# Patient Record
Sex: FEMALE | Race: WHITE | NOT HISPANIC OR LATINO | Employment: OTHER | URBAN - NONMETROPOLITAN AREA
[De-identification: names, ages, dates, MRNs, and addresses within clinical notes are randomized per-mention and may not be internally consistent; named-entity substitution may affect disease eponyms.]

---

## 2021-01-22 ENCOUNTER — HOSPITAL ENCOUNTER (EMERGENCY)
Facility: HOSPITAL | Age: 46
Discharge: HOME | End: 2021-01-22
Attending: EMERGENCY MEDICINE
Payer: MEDICARE

## 2021-01-22 VITALS
RESPIRATION RATE: 18 BRPM | TEMPERATURE: 98.1 F | OXYGEN SATURATION: 98 % | HEART RATE: 88 BPM | DIASTOLIC BLOOD PRESSURE: 64 MMHG | SYSTOLIC BLOOD PRESSURE: 116 MMHG

## 2021-01-22 DIAGNOSIS — R19.7 DIARRHEA: Primary | ICD-10-CM

## 2021-01-22 LAB
ALBUMIN SERPL BCP-MCNC: 3.1 G/DL (ref 3.5–5)
ALP SERPL-CCNC: 70 U/L (ref 46–116)
ALT SERPL W P-5'-P-CCNC: 24 U/L (ref 12–78)
ANION GAP SERPL CALCULATED.3IONS-SCNC: 7 MMOL/L (ref 4–13)
AST SERPL W P-5'-P-CCNC: 12 U/L (ref 5–45)
BASOPHILS # BLD AUTO: 0.04 THOUSANDS/ΜL (ref 0–0.1)
BASOPHILS NFR BLD AUTO: 1 % (ref 0–1)
BILIRUB SERPL-MCNC: 0.37 MG/DL (ref 0.2–1)
BUN SERPL-MCNC: 11 MG/DL (ref 5–25)
CALCIUM ALBUM COR SERPL-MCNC: 9.3 MG/DL (ref 8.3–10.1)
CALCIUM SERPL-MCNC: 8.6 MG/DL (ref 8.3–10.1)
CHLORIDE SERPL-SCNC: 104 MMOL/L (ref 100–108)
CO2 SERPL-SCNC: 28 MMOL/L (ref 21–32)
CREAT SERPL-MCNC: 0.87 MG/DL (ref 0.6–1.3)
EOSINOPHIL # BLD AUTO: 0.27 THOUSAND/ΜL (ref 0–0.61)
EOSINOPHIL NFR BLD AUTO: 3 % (ref 0–6)
ERYTHROCYTE [DISTWIDTH] IN BLOOD BY AUTOMATED COUNT: 12.4 % (ref 11.6–15.1)
GFR SERPL CREATININE-BSD FRML MDRD: 82 ML/MIN/1.73SQ M
GLUCOSE SERPL-MCNC: 89 MG/DL (ref 65–140)
HCT VFR BLD AUTO: 37.5 % (ref 34.8–46.1)
HGB BLD-MCNC: 12.1 G/DL (ref 11.5–15.4)
IMM GRANULOCYTES # BLD AUTO: 0.02 THOUSAND/UL (ref 0–0.2)
IMM GRANULOCYTES NFR BLD AUTO: 0 % (ref 0–2)
LACTATE SERPL-SCNC: 0.9 MMOL/L (ref 0.5–2)
LYMPHOCYTES # BLD AUTO: 1.82 THOUSANDS/ΜL (ref 0.6–4.47)
LYMPHOCYTES NFR BLD AUTO: 23 % (ref 14–44)
MCH RBC QN AUTO: 29.1 PG (ref 26.8–34.3)
MCHC RBC AUTO-ENTMCNC: 32.3 G/DL (ref 31.4–37.4)
MCV RBC AUTO: 90 FL (ref 82–98)
MONOCYTES # BLD AUTO: 0.87 THOUSAND/ΜL (ref 0.17–1.22)
MONOCYTES NFR BLD AUTO: 11 % (ref 4–12)
NEUTROPHILS # BLD AUTO: 4.83 THOUSANDS/ΜL (ref 1.85–7.62)
NEUTS SEG NFR BLD AUTO: 62 % (ref 43–75)
NRBC BLD AUTO-RTO: 0 /100 WBCS
PLATELET # BLD AUTO: 203 THOUSANDS/UL (ref 149–390)
PMV BLD AUTO: 10.5 FL (ref 8.9–12.7)
POTASSIUM SERPL-SCNC: 3.7 MMOL/L (ref 3.5–5.3)
PROT SERPL-MCNC: 6.8 G/DL (ref 6.4–8.2)
RBC # BLD AUTO: 4.16 MILLION/UL (ref 3.81–5.12)
SODIUM SERPL-SCNC: 139 MMOL/L (ref 136–145)
WBC # BLD AUTO: 7.85 THOUSAND/UL (ref 4.31–10.16)

## 2021-01-22 PROCEDURE — 83605 ASSAY OF LACTIC ACID: CPT | Performed by: PHYSICIAN ASSISTANT

## 2021-01-22 PROCEDURE — 96361 HYDRATE IV INFUSION ADD-ON: CPT

## 2021-01-22 PROCEDURE — 99282 EMERGENCY DEPT VISIT SF MDM: CPT | Performed by: EMERGENCY MEDICINE

## 2021-01-22 PROCEDURE — 85025 COMPLETE CBC W/AUTO DIFF WBC: CPT | Performed by: PHYSICIAN ASSISTANT

## 2021-01-22 PROCEDURE — 96360 HYDRATION IV INFUSION INIT: CPT

## 2021-01-22 PROCEDURE — 80053 COMPREHEN METABOLIC PANEL: CPT | Performed by: PHYSICIAN ASSISTANT

## 2021-01-22 PROCEDURE — 99284 EMERGENCY DEPT VISIT MOD MDM: CPT

## 2021-01-22 PROCEDURE — 36415 COLL VENOUS BLD VENIPUNCTURE: CPT | Performed by: PHYSICIAN ASSISTANT

## 2021-01-22 RX ORDER — ARIPIPRAZOLE 20 MG/1
20 TABLET ORAL DAILY
COMMUNITY

## 2021-01-22 RX ORDER — LAMOTRIGINE 150 MG/1
TABLET ORAL
COMMUNITY

## 2021-01-22 RX ORDER — SERTRALINE HYDROCHLORIDE 100 MG/1
100 TABLET, FILM COATED ORAL DAILY
COMMUNITY

## 2021-01-22 RX ADMIN — SODIUM CHLORIDE 1000 ML: 0.9 INJECTION, SOLUTION INTRAVENOUS at 16:42

## 2021-01-22 NOTE — ED PROVIDER NOTES
History  Chief Complaint   Patient presents with    Diarrhea     Patient states diarrhea for approximately 11 days  C/O chills and abdominal cramping  Took immodium last pm with minimal relief  37-year-old female presents emergency department for evaluation of diarrhea  Patient with recent hospital admission to [facility] for acute respiratory failure, NSTEMI was admitted from 12/31/20 to 1/9/21  Patient states she was on antibiotics at this time however is unsure of what this antibiotic was  Patient states she does not remember much from her visit  States she did call her cardiologist and ask if any new medications that she has been prescribed could be causing this symptoms and they had said no but it could be C diff due to antibiotics  Patient states daughter had C diff several years ago and her stool has similar odor  She reports she is going approximately 18 times a day  Denies any black tarry stools or bright red blood in her stool  Patient reports some mild abdominal cramping prior to having BM today  Denies any current abdominal pain  She denies fevers  Reports she has had intermittent chills  She denies nausea or vomiting  Denies decreased urination  Denies urinary frequency, hematuria, dysuria  Patient took 3 Imodium prior to arrival has not had bowel movement since  Patient states she has been trying to get a family doctor but due to [place] nobody is accepting new patients  History provided by:  Patient  Diarrhea  Quality:  Watery  Severity:  Moderate  Onset quality:  Gradual  Number of episodes:  18/day  Progression:  Unchanged  Relieved by: Imodium  Associated symptoms: chills    Associated symptoms: no abdominal pain, no arthralgias, no recent cough, no diaphoresis, no fever, no headaches, no myalgias, no URI and no vomiting        Prior to Admission Medications   Prescriptions Last Dose Informant Patient Reported? Taking?    ARIPiprazole (ABILIFY) 20 MG tablet Yes No   Sig: Take 20 mg by mouth daily   lamoTRIgine (LaMICtal) 150 MG tablet   Yes No   Sig: lamotrigine 150 mg tablet   sertraline (ZOLOFT) 100 mg tablet   Yes No   Sig: Take 100 mg by mouth daily      Facility-Administered Medications: None       Past Medical History:   Diagnosis Date    Depression     Hyperlipemia        History reviewed  No pertinent surgical history  History reviewed  No pertinent family history  I have reviewed and agree with the history as documented  E-Cigarette/Vaping    E-Cigarette Use Never User      E-Cigarette/Vaping Substances     Social History     Tobacco Use    Smoking status: Never Smoker    Smokeless tobacco: Never Used   Substance Use Topics    Alcohol use: Not Currently    Drug use: Never       Review of Systems   Constitutional: Positive for chills  Negative for appetite change, diaphoresis, fatigue and fever  HENT: Negative  Eyes: Negative for visual disturbance  Respiratory: Negative for cough, choking, chest tightness, shortness of breath, wheezing and stridor  Cardiovascular: Negative  Negative for chest pain, palpitations and leg swelling  Gastrointestinal: Positive for diarrhea  Negative for abdominal distention, abdominal pain, anal bleeding, blood in stool, constipation, nausea, rectal pain and vomiting  Genitourinary: Negative  Musculoskeletal: Negative  Negative for arthralgias and myalgias  Skin: Negative  Neurological: Negative  Negative for headaches  All other systems reviewed and are negative  Physical Exam  Physical Exam  Vitals signs and nursing note reviewed  Constitutional:       General: She is not in acute distress  Appearance: Normal appearance  She is normal weight  She is not ill-appearing, toxic-appearing or diaphoretic  HENT:      Head: Normocephalic and atraumatic  Nose: Nose normal  No congestion or rhinorrhea        Mouth/Throat:      Mouth: Mucous membranes are moist       Pharynx: Oropharynx is clear  No oropharyngeal exudate or posterior oropharyngeal erythema  Eyes:      Extraocular Movements: Extraocular movements intact  Conjunctiva/sclera: Conjunctivae normal       Pupils: Pupils are equal, round, and reactive to light  Neck:      Musculoskeletal: Normal range of motion and neck supple  No muscular tenderness  Cardiovascular:      Rate and Rhythm: Normal rate and regular rhythm  Pulmonary:      Effort: Pulmonary effort is normal  No respiratory distress  Breath sounds: Normal breath sounds  No stridor  No wheezing, rhonchi or rales  Chest:      Chest wall: No tenderness  Abdominal:      General: Abdomen is flat  Bowel sounds are normal  There is no distension  Palpations: Abdomen is soft  Tenderness: There is no abdominal tenderness  There is no left CVA tenderness or guarding  Musculoskeletal: Normal range of motion  General: No swelling, tenderness or deformity  Right lower leg: No edema  Left lower leg: No edema  Skin:     General: Skin is warm and dry  Capillary Refill: Capillary refill takes less than 2 seconds  Coloration: Skin is not pale  Findings: No bruising, erythema, lesion or rash  Neurological:      General: No focal deficit present  Mental Status: She is alert and oriented to person, place, and time  Motor: No weakness  Gait: Gait normal    Psychiatric:         Mood and Affect: Mood normal          Behavior: Behavior normal          Thought Content:  Thought content normal          Judgment: Judgment normal          Vital Signs  ED Triage Vitals [01/22/21 1557]   Temperature Pulse Respirations Blood Pressure SpO2   98 1 °F (36 7 °C) 85 16 127/70 98 %      Temp Source Heart Rate Source Patient Position - Orthostatic VS BP Location FiO2 (%)   Temporal Monitor Lying Left arm --      Pain Score       5           Vitals:    01/22/21 1557 01/22/21 1600 01/22/21 1815   BP: 127/70 118/66 116/64 Pulse: 85 91 88   Patient Position - Orthostatic VS: Lying           Visual Acuity      ED Medications  Medications   sodium chloride 0 9 % bolus 1,000 mL (0 mL Intravenous Stopped 1/22/21 1813)       Diagnostic Studies  Results Reviewed     Procedure Component Value Units Date/Time    Lactic acid [[id number]]  (Normal) Collected: 01/22/21 1642    Lab Status: Final result Specimen: Blood from Arm, Left Updated: 01/22/21 1717     LACTIC ACID 0 9 mmol/L     Narrative:      Result may be elevated if tourniquet was used during collection      Comprehensive metabolic panel [[id number]]  (Abnormal) Collected: 01/22/21 1642    Lab Status: Final result Specimen: Blood from Arm, Left Updated: 01/22/21 1712     Sodium 139 mmol/L      Potassium 3 7 mmol/L      Chloride 104 mmol/L      CO2 28 mmol/L      ANION GAP 7 mmol/L      BUN 11 mg/dL      Creatinine 0 87 mg/dL      Glucose 89 mg/dL      Calcium 8 6 mg/dL      Corrected Calcium 9 3 mg/dL      AST 12 U/L      ALT 24 U/L      Alkaline Phosphatase 70 U/L      Total Protein 6 8 g/dL      Albumin 3 1 g/dL      Total Bilirubin 0 37 mg/dL      eGFR 82 ml/min/1 73sq m     Narrative:      [place] guidelines for Chronic Kidney Disease (CKD):     Stage 1 with normal or high GFR (GFR > 90 mL/min/1 73 square meters)    Stage 2 Mild CKD (GFR = 60-89 mL/min/1 73 square meters)    Stage 3A Moderate CKD (GFR = 45-59 mL/min/1 73 square meters)    Stage 3B Moderate CKD (GFR = 30-44 mL/min/1 73 square meters)    Stage 4 Severe CKD (GFR = 15-29 mL/min/1 73 square meters)    Stage 5 End Stage CKD (GFR <15 mL/min/1 73 square meters)  Note: GFR calculation is accurate only with a steady state creatinine    CBC and differential [[id number]] Collected: 01/22/21 1642    Lab Status: Final result Specimen: Blood from Arm, Left Updated: 01/22/21 1655     WBC 7 85 Thousand/uL      RBC 4 16 Million/uL      Hemoglobin 12 1 g/dL      Hematocrit 37 5 %      MCV 90 fL      MCH 29 1 pg      MCHC 32 3 g/dL      RDW 12 4 %      MPV 10 5 fL      Platelets 596 Thousands/uL      nRBC 0 /100 WBCs      Neutrophils Relative 62 %      Immat GRANS % 0 %      Lymphocytes Relative 23 %      Monocytes Relative 11 %      Eosinophils Relative 3 %      Basophils Relative 1 %      Neutrophils Absolute 4 83 Thousands/µL      Immature Grans Absolute 0 02 Thousand/uL      Lymphocytes Absolute 1 82 Thousands/µL      Monocytes Absolute 0 87 Thousand/µL      Eosinophils Absolute 0 27 Thousand/µL      Basophils Absolute 0 04 Thousands/µL     Clostridium difficile toxin by PCR with EIA [[id number]]     Lab Status: No result Specimen: Stool from Per Rectum                  No orders to display              Procedures  Procedures         ED Course  ED Course as of Jan 22 1822 Fri Jan 22, 2021   1702 CBC unremarkable      1742 CMP unremarkable  Lactate normal      1756 I discussed results and findings with patient  She is resting comfortably  Was unable to provide stool sample in the emergency department  We discussed symptomatic treatment and symptoms that require prompt return to the ED for further evaluation patient verbalized understanding  Patient will go home with outpatient order for C diff and will provide sample when she is able  Family doctor referral was placed this patient does not currently PCP  Patient agreed with this treatment plan she was educated on a bland diet  He remained well emergency department was discharged                  MDM  Number of Diagnoses or Management Options  Diarrhea: new and requires workup  Diagnosis management comments: 82-year-old female presents emergency department for evaluation of diarrhea times several days  Vitals in medical record reviewed  Patient concern for C diff  On exam abdomen soft, nontender, nondistended  Moist mucous membranes  Patient received fluids in the emergency department  Laboratory findings relatively unremarkable  No leukocytosis  Vitals stable  Patient unable to provide stool sample  We discussed results and findings, patient sent home with outpatient stool sample study order, PCP referral was placed  Patient was educated on symptomatic treatment and symptoms that require prompt return to the ED for further evaluation and she verbalized understanding  She agreed to this treatment plan and was discharged       Amount and/or Complexity of Data Reviewed  Clinical lab tests: ordered and reviewed  Review and summarize past medical records: yes        Disposition  Final diagnoses:   Diarrhea     Time reflects when diagnosis was documented in both MDM as applicable and the Disposition within this note     Time User Action Codes Description Comment    1/22/2021  5:47 PM Danial Proctor [R19 7] Diarrhea       ED Disposition     ED Disposition Condition Date/Time Comment    Discharge Stable Fri Jan 22, 2021  5:47 PM Tiffanie Stairs discharge to home/self care  Follow-up Information     Follow up With Specialties Details Why Emily U  94  In 1 week As needed, If symptoms worsen [address] [address]  [phone]            Discharge Medication List as of 1/22/2021  6:01 PM      CONTINUE these medications which have NOT CHANGED    Details   ARIPiprazole (ABILIFY) 20 MG tablet Take 20 mg by mouth daily, Historical Med      lamoTRIgine (LaMICtal) 150 MG tablet lamotrigine 150 mg tablet, Historical Med      sertraline (ZOLOFT) 100 mg tablet Take 100 mg by mouth daily, Historical Med           Outpatient Discharge Orders   Clostridium difficile toxin by PCR with EIA   Standing Status: Future Standing Exp   Date: 01/22/22       PDMP Review     None          ED Provider  Electronically Signed by           Vipul Cardenas PA-C  01/22/21 4740

## 2021-01-22 NOTE — DISCHARGE INSTRUCTIONS
If you have any new or worsening symptoms please return to the emergency department  Please return with stool sample when you are able  Please follow up with a family doctor, a referral has been placed for you and the number for [facility] is provided on these discharge instructions

## 2021-01-23 ENCOUNTER — LAB (OUTPATIENT)
Dept: LAB | Facility: HOSPITAL | Age: 46
End: 2021-01-23
Attending: PHYSICIAN ASSISTANT
Payer: MEDICARE

## 2021-01-23 DIAGNOSIS — R19.7 DIARRHEA: ICD-10-CM

## 2021-01-23 PROCEDURE — 87493 C DIFF AMPLIFIED PROBE: CPT

## 2021-01-23 NOTE — ED ATTENDING ATTESTATION
1/22/2021  I, Omero Alford, DO, discussed the patient with the resident/non-physician practitioner and agree with the resident's/non-physician practitioner's findings, Plan of Care, and MDM as documented in the resident's/non-physician practitioner's note, except where noted  All available labs and Radiology studies were reviewed  I was present for key portions of any procedure(s) performed by the resident/non-physician practitioner and I was immediately available to provide assistance  At this point I agree with the current assessment done in the Emergency Department

## 2021-01-24 LAB
C DIFF TOX A+B STL QL IA: POSITIVE
C DIFF TOX B TCDB STL QL NAA+PROBE: POSITIVE

## 2021-01-27 RX ORDER — VANCOMYCIN HYDROCHLORIDE 125 MG/1
125 CAPSULE ORAL 4 TIMES DAILY
Qty: 40 CAPSULE | Refills: 0 | Status: SHIPPED | OUTPATIENT
Start: 2021-01-27 | End: 2021-02-06

## 2022-02-26 ENCOUNTER — OFFICE VISIT (OUTPATIENT)
Dept: URGENT CARE | Facility: CLINIC | Age: 47
End: 2022-02-26
Payer: MEDICARE

## 2022-02-26 VITALS — BODY MASS INDEX: 42.59 KG/M2 | HEIGHT: 66 IN | WEIGHT: 265 LBS

## 2022-02-26 DIAGNOSIS — R68.89 FLU-LIKE SYMPTOMS: Primary | ICD-10-CM

## 2022-02-26 PROCEDURE — U0005 INFEC AGEN DETEC AMPLI PROBE: HCPCS | Performed by: EMERGENCY MEDICINE

## 2022-02-26 PROCEDURE — 99203 OFFICE O/P NEW LOW 30 MIN: CPT | Performed by: EMERGENCY MEDICINE

## 2022-02-26 PROCEDURE — U0003 INFECTIOUS AGENT DETECTION BY NUCLEIC ACID (DNA OR RNA); SEVERE ACUTE RESPIRATORY SYNDROME CORONAVIRUS 2 (SARS-COV-2) (CORONAVIRUS DISEASE [COVID-19]), AMPLIFIED PROBE TECHNIQUE, MAKING USE OF HIGH THROUGHPUT TECHNOLOGIES AS DESCRIBED BY CMS-2020-01-R: HCPCS | Performed by: EMERGENCY MEDICINE

## 2022-02-26 PROCEDURE — G0463 HOSPITAL OUTPT CLINIC VISIT: HCPCS | Performed by: EMERGENCY MEDICINE

## 2022-02-26 RX ORDER — METOPROLOL SUCCINATE 25 MG/1
TABLET, EXTENDED RELEASE ORAL
COMMUNITY
Start: 2022-01-03

## 2022-02-26 NOTE — PATIENT INSTRUCTIONS
You have been diagnosed with a flu-like illness, and your symptoms should resolve over the next 7 to 10 days with the treatments recommended today  If they do not, it is possible that you have developed a bacterial infection and you should return  If you were to take an antibiotic while you are still in the viral stage, you will not get better any faster, but could kill off good germs in your body as well as make germs resistant to the antibiotic  Take an expectorant - guaifenesin should be the only ingredient - during the day, and the cough suppressant (ex  Robitussin DM or Tessalon) if needed at night only  Take Zinc 50 mg every 12 hours for the next week  You should also take Quercetin 500 mg twice daily with it  You should also take vitamin D3 5000 i u s per day for the next 1 week, and vitamin-C 1 g daily  May take Flonase as discussed  You may also take a decongestant like Sudafed, unless you have hypertension or cardiac disease  You may take Imodium for diarrhea according to package instructions  Today you were tested for COVID-19 and influenza  Results will return approximately 5 days  The fastest way to receive results is to download the [facility] MyChart sissy on your phone or great account on a computer  If you view your results on [facility], we will not call you  If you do not see results on [facility], we will call you if your positive or negative  WE CANNOT PRINT YOUR TEST RESULTS FROM THE URGENT CARE  This is against a law called HIPAA  You may print results from your [employer] (IT help [phone] option 5) or call medical records at [phone]  Prophylactically self quarantine  Department of health's newest recommendations as of December 27,2021 state the following:     IF you TEST POSITIVE for COVID-19  -stay home for 5 days  If you have no symptoms or your symptoms are resolving after 5 days, you can leave your house   Continue to wear a mask around others for 5 additional days  If you have a fever, continue to stay home until you fever resolves  IF YOU WERE EXPOSED TO SOMEONE WITH COVID 19  -if you have been boosted OR completed the primary series of Pfizer or Moderna vaccine within the last 6 months OR completed the primary series of J&J vaccine within the last 2 months, wear a mask around others for 10 days  Get tested on day 5 if possible  If you develop symptoms, get a test and stay home      -if you completed the primary series of Pfizer or Moderna vaccine over 6 months ago and are NOT boosted OR completed the primary series of J&J over 2 months ago and are NOT boosted OR are unvaccinated, stay home for 5 days  After that continue to wear a mask around others for 5 additional days  If you can not quarantine you must wear a mask for 10 days  Test on day 5 if possible  If you develops symptoms get a test and stay home  Drink lots of fluids to maintain hydration  Do not touch your face, wash hands often, and practice social distancing  There is no treatment for simple outpatient COVID-19 patients however, CDC recommends 2000 units vitamin D3 to boost the immune system  Those with severe illness, older age, or multiple comorbidities may qualify for monoclonal antibody infusions as treatment  Please call your doctor to see if you qualify  Call your family doctor to have a follow-up appointment in next few days  Go to ER if he began experiencing chest pain, shortness of breath, fever that is not responding to antipyretics or other severe symptoms  Follow up with PCP in 3-5 days  Proceed to ER if symptoms worsen  Flu-like illness   AMBULATORY CARE:   Flu-like illness is an infection caused by a virus  The flu is easily spread when an infected person coughs, sneezes, or has close contact with others  You may be able to spread the flu to others for 1 week or longer after signs or symptoms appear     Common signs and symptoms include the following:   · Fever and chills    · Headaches, body aches, and muscle or joint pain    · Cough, runny nose, and sore throat    · Loss of appetite, nausea, vomiting, or diarrhea    · Tiredness    · Trouble breathing  Call 911 for any of the following:   · You have trouble breathing, and your lips look purple or blue  · You have a seizure  Seek care immediately if:   · You are dizzy, or you are urinating less or not at all  · You have a headache with a stiff neck, and you feel tired or confused  · You have new pain or pressure in your chest     · Your symptoms, such as shortness of breath, vomiting, or diarrhea, get worse  · Your symptoms, such as fever and coughing, seem to get better, but then get worse  Contact your healthcare provider if:   · You have new muscle pain or weakness  · You have questions or concerns about your condition or care  Treatment for influenza  may include any of the following:  · Acetaminophen decreases pain and fever  It is available without a doctor's order  Ask how much to take and how often to take it  Follow directions  Acetaminophen can cause liver damage if not taken correctly  · NSAIDs  such as ibuprofen, help decrease swelling, pain, and fever  This medicine is available with or without a doctor's order  NSAIDs can cause stomach bleeding or kidney problems in certain people  If you take blood thinner medicine, always ask your healthcare provider if NSAIDs are safe for you  Always read the medicine label and follow directions  · Antivirals  help fight a viral infection  Manage your symptoms:   · Rest  as much as you can to help you recover  · Drink liquids as directed  to help prevent dehydration  Ask how much liquid to drink each day and which liquids are best for you  Prevent the spread of the flu:   · Wash your hands often  Use soap and water  Wash your hands after you use the bathroom, change a child's diapers, or sneeze  Wash your hands before you prepare or eat food  Use gel hand cleanser when soap and water are not available  Do not touch your eyes, nose, or mouth unless you have washed your hands first        · Cover your mouth when you sneeze or cough  Cough into a tissue or the bend of your arm  · Clean shared items with a germ-killing   Clean table surfaces, doorknobs, and light switches  Do not share towels, silverware, and dishes with people who are sick  Wash bed sheets, towels, silverware, and dishes with soap and water  · Wear a mask  over your mouth and nose if you are sick or are near anyone who is sick  · Stay away from others  if you are sick  · Influenza vaccine  helps prevent influenza (flu)  Everyone older than 6 months should get a yearly influenza vaccine  Get the vaccine as soon as it is available, usually in September or October each year  Follow up with your healthcare provider as directed:  Write down your questions so you remember to ask them during your visits  © 2017 [address] [employer]  Information is for End User's use only and may not be sold, redistributed or otherwise used for commercial purposes  All illustrations and images included in CareNotes® are the copyrighted property of [employer]  or [employer]  The above information is an  only  It is not intended as medical advice for individual conditions or treatments  Talk to your doctor, nurse or pharmacist before following any medical regimen to see if it is safe and effective for you  [address]     Your healthcare provider and/or public health staff have evaluated you and have determined that you do not need to be hospitalized at this time  At this time you can be isolated at home where you will be monitored by staff from your local or state health department   You should carefully follow the prevention and isolation steps below until a healthcare provider or local or state health department says that you can return to your normal activities  Stay home except to get medical care     People who are mildly ill with COVID-19 are able to isolate at home during their illness  You should restrict activities outside your home, except for getting medical care  Do not go to work, school, or public areas  Avoid using public transportation, ride-sharing, or taxis  Separate yourself from other people and animals in your home     People: As much as possible, you should stay in a specific room and away from other people in your home  Also, you should use a separate bathroom, if available  Animals: You should restrict contact with pets and other animals while you are sick with COVID-19, just like you would around other people  Although there have not been reports of pets or other animals becoming sick with COVID-19, it is still recommended that people sick with COVID-19 limit contact with animals until more information is known about the virus  When possible, have another member of your household care for your animals while you are sick  If you are sick with COVID-19, avoid contact with your pet, including petting, snuggling, being kissed or licked, and sharing food  If you must care for your pet or be around animals while you are sick, wash your hands before and after you interact with pets and wear a facemask  See COVID-19 and Animals for more information  Call ahead before visiting your doctor     If you have a medical appointment, call the healthcare provider and tell them that you have or may have COVID-19  This will help the healthcare providers office take steps to keep other people from getting infected or exposed  Wear a facemask     You should wear a facemask when you are around other people (e g , sharing a room or vehicle) or pets and before you enter a healthcare providers office   If you are not able to wear a facemask (for example, because it causes trouble breathing), then people who live with you should not stay in the same room with you, or they should wear a facemask if they enter your room  Cover your coughs and sneezes     Cover your mouth and nose with a tissue when you cough or sneeze  Throw used tissues in a lined trash can  Immediately wash your hands with soap and water for at least 20 seconds or, if soap and water are not available, clean your hands with an alcohol-based hand  that contains at least 60% alcohol  Clean your hands often     Wash your hands often with soap and water for at least 20 seconds, especially after blowing your nose, coughing, or sneezing; going to the bathroom; and before eating or preparing food  If soap and water are not readily available, use an alcohol-based hand  with at least 60% alcohol, covering all surfaces of your hands and rubbing them together until they feel dry  Soap and water are the best option if hands are visibly dirty  Avoid touching your eyes, nose, and mouth with unwashed hands  Avoid sharing personal household items     You should not share dishes, drinking glasses, cups, eating utensils, towels, or bedding with other people or pets in your home  After using these items, they should be washed thoroughly with soap and water  Clean all high-touch surfaces everyday     High touch surfaces include counters, tabletops, doorknobs, bathroom fixtures, toilets, phones, keyboards, tablets, and bedside tables  Also, clean any surfaces that may have blood, stool, or body fluids on them  Use a household cleaning spray or wipe, according to the label instructions  Labels contain instructions for safe and effective use of the cleaning product including precautions you should take when applying the product, such as wearing gloves and making sure you have good ventilation during use of the product  Monitor your symptoms     Seek prompt medical attention if your illness is worsening (e g , difficulty breathing)  Before seeking care, call your healthcare provider and tell them that you have, or are being evaluated for, COVID-19  Put on a facemask before you enter the facility  These steps will help the healthcare providers office to keep other people in the office or waiting room from getting infected or exposed  Ask your healthcare provider to call the local or state health department  Persons who are placed under active monitoring or facilitated self-monitoring should follow instructions provided by their local health department or occupational health professionals, as appropriate  If you have a medical emergency and need to call 911, notify the dispatch personnel that you have, or are being evaluated for COVID-19  If possible, put on a facemask before emergency medical services arrive  Discontinuing home isolation     Patients with confirmed COVID-19 should remain under home isolation precautions until the risk of secondary transmission to others is thought to be low  The decision to discontinue home isolation precautions should be made on a case-by-case basis, in consultation with healthcare providers and state and local health departments       Source: [employer]        Proceed to ER if symptoms worsen

## 2022-02-26 NOTE — PROGRESS NOTES
[facility] [facility]        NAME: Taylor Chand is a 42 y o  female  : 1975    MRN: [id number]  DATE: 2022  TIME: 12:40 PM    Assessment and Plan   Flu-like symptoms [R68 89]  1  Flu-like symptoms  COVID Only -Office Collect         Patient Instructions     Patient Instructions     You have been diagnosed with a flu-like illness, and your symptoms should resolve over the next 7 to 10 days with the treatments recommended today  If they do not, it is possible that you have developed a bacterial infection and you should return  If you were to take an antibiotic while you are still in the viral stage, you will not get better any faster, but could kill off good germs in your body as well as make germs resistant to the antibiotic  Take an expectorant - guaifenesin should be the only ingredient - during the day, and the cough suppressant (ex  Robitussin DM or Tessalon) if needed at night only  Take Zinc 50 mg every 12 hours for the next week  You should also take Quercetin 500 mg twice daily with it  You should also take vitamin D3 5000 i u s per day for the next 1 week, and vitamin-C 1 g daily  May take Flonase as discussed  You may also take a decongestant like Sudafed, unless you have hypertension or cardiac disease  You may take Imodium for diarrhea according to package instructions  Today you were tested for COVID-19 and influenza  Results will return approximately 5 days  The fastest way to receive results is to download the [facility] MyChart sissy on your phone or great account on a computer  If you view your results on I-DISPO, we will not call you  If you do not see results on I-DISPO, we will call you if your positive or negative  WE CANNOT PRINT YOUR TEST RESULTS FROM THE URGENT CARE  This is against a law called HIPAA  You may print results from your [employer] (IT help [phone] option 5) or call medical records at [phone]  Prophylactically self quarantine  Department of health's newest recommendations as of December 27,2021 state the following:     IF you TEST POSITIVE for COVID-19  -stay home for 5 days  If you have no symptoms or your symptoms are resolving after 5 days, you can leave your house  Continue to wear a mask around others for 5 additional days  If you have a fever, continue to stay home until you fever resolves  IF YOU WERE EXPOSED TO SOMEONE WITH COVID 19  -if you have been boosted OR completed the primary series of Pfizer or Moderna vaccine within the last 6 months OR completed the primary series of J&J vaccine within the last 2 months, wear a mask around others for 10 days  Get tested on day 5 if possible  If you develop symptoms, get a test and stay home      -if you completed the primary series of Pfizer or Moderna vaccine over 6 months ago and are NOT boosted OR completed the primary series of J&J over 2 months ago and are NOT boosted OR are unvaccinated, stay home for 5 days  After that continue to wear a mask around others for 5 additional days  If you can not quarantine you must wear a mask for 10 days  Test on day 5 if possible  If you develops symptoms get a test and stay home  Drink lots of fluids to maintain hydration  Do not touch your face, wash hands often, and practice social distancing  There is no treatment for simple outpatient COVID-19 patients however, CDC recommends 2000 units vitamin D3 to boost the immune system  Those with severe illness, older age, or multiple comorbidities may qualify for monoclonal antibody infusions as treatment  Please call your doctor to see if you qualify  Call your family doctor to have a follow-up appointment in next few days  Go to ER if he began experiencing chest pain, shortness of breath, fever that is not responding to antipyretics or other severe symptoms  Follow up with PCP in 3-5 days  Proceed to ER if symptoms worsen      Flu-like illness   AMBULATORY CARE:   Flu-like illness is an infection caused by a virus  The flu is easily spread when an infected person coughs, sneezes, or has close contact with others  You may be able to spread the flu to others for 1 week or longer after signs or symptoms appear  Common signs and symptoms include the following:   · Fever and chills    · Headaches, body aches, and muscle or joint pain    · Cough, runny nose, and sore throat    · Loss of appetite, nausea, vomiting, or diarrhea    · Tiredness    · Trouble breathing  Call 911 for any of the following:   · You have trouble breathing, and your lips look purple or blue  · You have a seizure  Seek care immediately if:   · You are dizzy, or you are urinating less or not at all  · You have a headache with a stiff neck, and you feel tired or confused  · You have new pain or pressure in your chest     · Your symptoms, such as shortness of breath, vomiting, or diarrhea, get worse  · Your symptoms, such as fever and coughing, seem to get better, but then get worse  Contact your healthcare provider if:   · You have new muscle pain or weakness  · You have questions or concerns about your condition or care  Treatment for influenza  may include any of the following:  · Acetaminophen decreases pain and fever  It is available without a doctor's order  Ask how much to take and how often to take it  Follow directions  Acetaminophen can cause liver damage if not taken correctly  · NSAIDs  such as ibuprofen, help decrease swelling, pain, and fever  This medicine is available with or without a doctor's order  NSAIDs can cause stomach bleeding or kidney problems in certain people  If you take blood thinner medicine, always ask your healthcare provider if NSAIDs are safe for you  Always read the medicine label and follow directions  · Antivirals  help fight a viral infection  Manage your symptoms:   · Rest  as much as you can to help you recover      · Drink liquids as directed  to help prevent dehydration  Ask how much liquid to drink each day and which liquids are best for you  Prevent the spread of the flu:   · Wash your hands often  Use soap and water  Wash your hands after you use the bathroom, change a child's diapers, or sneeze  Wash your hands before you prepare or eat food  Use gel hand cleanser when soap and water are not available  Do not touch your eyes, nose, or mouth unless you have washed your hands first        · Cover your mouth when you sneeze or cough  Cough into a tissue or the bend of your arm  · Clean shared items with a germ-killing   Clean table surfaces, doorknobs, and light switches  Do not share towels, silverware, and dishes with people who are sick  Wash bed sheets, towels, silverware, and dishes with soap and water  · Wear a mask  over your mouth and nose if you are sick or are near anyone who is sick  · Stay away from others  if you are sick  · Influenza vaccine  helps prevent influenza (flu)  Everyone older than 6 months should get a yearly influenza vaccine  Get the vaccine as soon as it is available, usually in September or October each year  Follow up with your healthcare provider as directed:  Write down your questions so you remember to ask them during your visits  © 2017 [employer]  Information is for End User's use only and may not be sold, redistributed or otherwise used for commercial purposes  All illustrations and images included in CareNotes® are the copyrighted property of [employer]  or [employer]  The above information is an  only  It is not intended as medical advice for individual conditions or treatments  Talk to your doctor, nurse or pharmacist before following any medical regimen to see if it is safe and effective for you       COVID-19    [address]     Your healthcare provider and/or public health staff have evaluated you and have determined that you do not need to be hospitalized at this time  At this time you can be isolated at home where you will be monitored by staff from your local or state health department  You should carefully follow the prevention and isolation steps below until a healthcare provider or local or state health department says that you can return to your normal activities  Stay home except to get medical care     People who are mildly ill with COVID-19 are able to isolate at home during their illness  You should restrict activities outside your home, except for getting medical care  Do not go to work, school, or public areas  Avoid using public transportation, ride-sharing, or taxis  Separate yourself from other people and animals in your home     People: As much as possible, you should stay in a specific room and away from other people in your home  Also, you should use a separate bathroom, if available  Animals: You should restrict contact with pets and other animals while you are sick with COVID-19, just like you would around other people  Although there have not been reports of pets or other animals becoming sick with COVID-19, it is still recommended that people sick with COVID-19 limit contact with animals until more information is known about the virus  When possible, have another member of your household care for your animals while you are sick  If you are sick with COVID-19, avoid contact with your pet, including petting, snuggling, being kissed or licked, and sharing food  If you must care for your pet or be around animals while you are sick, wash your hands before and after you interact with pets and wear a facemask  See COVID-19 and Animals for more information  Call ahead before visiting your doctor     If you have a medical appointment, call the healthcare provider and tell them that you have or may have COVID-19   This will help the healthcare providers office take steps to keep other people from getting infected or exposed  Wear a facemask     You should wear a facemask when you are around other people (e g , sharing a room or vehicle) or pets and before you enter a healthcare providers office  If you are not able to wear a facemask (for example, because it causes trouble breathing), then people who live with you should not stay in the same room with you, or they should wear a facemask if they enter your room  Cover your coughs and sneezes     Cover your mouth and nose with a tissue when you cough or sneeze  Throw used tissues in a lined trash can  Immediately wash your hands with soap and water for at least 20 seconds or, if soap and water are not available, clean your hands with an alcohol-based hand  that contains at least 60% alcohol  Clean your hands often     Wash your hands often with soap and water for at least 20 seconds, especially after blowing your nose, coughing, or sneezing; going to the bathroom; and before eating or preparing food  If soap and water are not readily available, use an alcohol-based hand  with at least 60% alcohol, covering all surfaces of your hands and rubbing them together until they feel dry  Soap and water are the best option if hands are visibly dirty  Avoid touching your eyes, nose, and mouth with unwashed hands  Avoid sharing personal household items     You should not share dishes, drinking glasses, cups, eating utensils, towels, or bedding with other people or pets in your home  After using these items, they should be washed thoroughly with soap and water  Clean all high-touch surfaces everyday     High touch surfaces include counters, tabletops, doorknobs, bathroom fixtures, toilets, phones, keyboards, tablets, and bedside tables  Also, clean any surfaces that may have blood, stool, or body fluids on them  Use a household cleaning spray or wipe, according to the label instructions   Labels contain instructions for safe and effective use of the cleaning product including precautions you should take when applying the product, such as wearing gloves and making sure you have good ventilation during use of the product  Monitor your symptoms     Seek prompt medical attention if your illness is worsening (e g , difficulty breathing)  Before seeking care, call your healthcare provider and tell them that you have, or are being evaluated for, COVID-19  Put on a facemask before you enter the facility  These steps will help the healthcare providers office to keep other people in the office or waiting room from getting infected or exposed  Ask your healthcare provider to call the local or [facility] health department  Persons who are placed under active monitoring or facilitated self-monitoring should follow instructions provided by their local health department or occupational health professionals, as appropriate  If you have a medical emergency and need to call 911, notify the dispatch personnel that you have, or are being evaluated for COVID-19  If possible, put on a facemask before emergency medical services arrive  Discontinuing home isolation     Patients with confirmed COVID-19 should remain under home isolation precautions until the risk of secondary transmission to others is thought to be low  The decision to discontinue home isolation precautions should be made on a case-by-case basis, in consultation with healthcare providers and [facility] and [facility] health departments  Source: [employer]        Proceed to ER if symptoms worsen      Follow up with PCP in 3-5 days  Proceed to  ER if symptoms worsen  Chief Complaint     Chief Complaint   Patient presents with    COVID-19     nasal congestion, headache and cough x 1 day          History of Present Illness       Patient complains of sore throat, headaches, cough, congestion for past day        Review of Systems   Review of Systems Constitutional: Negative for appetite change, chills, fatigue and fever  HENT: Positive for congestion, rhinorrhea and sore throat  Negative for sinus pressure, trouble swallowing and voice change  Respiratory: Positive for cough  Negative for chest tightness, shortness of breath and wheezing  Cardiovascular: Negative for chest pain  Gastrointestinal: Negative for nausea  Musculoskeletal: Negative for myalgias  Neurological: Positive for headaches  Current Medications       Current Outpatient Medications:     ARIPiprazole (ABILIFY) 20 MG tablet, Take 20 mg by mouth daily, Disp: , Rfl:     lamoTRIgine (LaMICtal) 150 MG tablet, lamotrigine 150 mg tablet, Disp: , Rfl:     metoprolol succinate (TOPROL-XL) 25 mg 24 hr tablet, , Disp: , Rfl:     sertraline (ZOLOFT) 100 mg tablet, Take 100 mg by mouth daily, Disp: , Rfl:     Current Allergies     Allergies as of 02/26/2022 - Reviewed 02/26/2022   Allergen Reaction Noted    Metronidazole Facial Swelling 01/22/2021    Sulfa antibiotics Facial Swelling 02/26/2022            The following portions of the patient's history were reviewed and updated as appropriate: allergies, current medications, past family history, past medical history, past social history, past surgical history and problem list      Past Medical History:   Diagnosis Date    Depression     Hyperlipemia        History reviewed  No pertinent surgical history  Family History   Problem Relation Age of Onset    No Known Problems Mother     No Known Problems Father          Medications have been verified  Objective   Ht 5' 6" (1 676 m)   Wt 120 kg (265 lb)   LMP 12/24/2020   BMI 42 77 kg/m²        Physical Exam     Physical Exam  Vitals and nursing note reviewed  Constitutional:       General: She is not in acute distress  Appearance: She is well-developed  HENT:      Head: Normocephalic and atraumatic  Nose: Mucosal edema and congestion present  Mouth/Throat:      Pharynx: Posterior oropharyngeal erythema present  No oropharyngeal exudate  Tonsils: No tonsillar abscesses  Cardiovascular:      Rate and Rhythm: Regular rhythm  Comments: Mild tachycardia  Pulmonary:      Effort: Pulmonary effort is normal  No respiratory distress  Breath sounds: No wheezing or rales  Musculoskeletal:      Cervical back: Neck supple  Skin:     General: Skin is warm and dry  Findings: No rash  Neurological:      Mental Status: She is alert and oriented to person, place, and time  Psychiatric:         Mood and Affect: Mood normal          Behavior: Behavior normal          Thought Content:  Thought content normal          Judgment: Judgment normal

## 2022-02-27 LAB — SARS-COV-2 RNA RESP QL NAA+PROBE: NEGATIVE

## 2023-02-10 ENCOUNTER — TELEPHONE (OUTPATIENT)
Dept: ADMINISTRATIVE | Facility: OTHER | Age: 48
End: 2023-02-10

## 2023-02-10 NOTE — TELEPHONE ENCOUNTER
----- Message from Grace Savage sent at 2/8/2023 10:55 AM EST -----  Regarding: CERVICAL CANCER SCREEN  02/08/23 10:59 AM    Hello, our patient Alice Velazquez has had Pap Smear (HPV) aka Cervical Cancer Screening completed/performed  Please assist in updating the patient chart by pulling the Care Everywhere (CE) document  The date of service is 10/12/2021       Thank you,  Grace Savage  [facility] PRIMARY [facility]

## 2023-02-13 RX ORDER — ARIPIPRAZOLE 30 MG/1
TABLET ORAL
COMMUNITY
Start: 2023-01-25

## 2023-02-13 RX ORDER — ARIPIPRAZOLE 15 MG/1
TABLET ORAL
COMMUNITY
Start: 2023-01-25

## 2023-02-14 NOTE — TELEPHONE ENCOUNTER
Upon review of the In Basket request we were able to locate, review, and update the patient chart as requested for Pap Smear (HPV) aka Cervical Cancer Screening  Any additional questions or concerns should be emailed to the Practice Liaisons via the appropriate education email address, please do not reply via In Basket      Thank you  Jd Boucher

## 2023-02-15 ENCOUNTER — OFFICE VISIT (OUTPATIENT)
Dept: FAMILY MEDICINE CLINIC | Facility: CLINIC | Age: 48
End: 2023-02-15

## 2023-02-15 VITALS
HEIGHT: 66 IN | DIASTOLIC BLOOD PRESSURE: 96 MMHG | TEMPERATURE: 98.9 F | SYSTOLIC BLOOD PRESSURE: 144 MMHG | OXYGEN SATURATION: 98 % | WEIGHT: 272.93 LBS | BODY MASS INDEX: 43.86 KG/M2 | HEART RATE: 89 BPM

## 2023-02-15 DIAGNOSIS — I70.219 ATHEROSCLEROTIC PVD WITH INTERMITTENT CLAUDICATION (HCC): ICD-10-CM

## 2023-02-15 DIAGNOSIS — I10 ESSENTIAL HYPERTENSION: ICD-10-CM

## 2023-02-15 DIAGNOSIS — I51.81 TAKOTSUBO CARDIOMYOPATHY: ICD-10-CM

## 2023-02-15 DIAGNOSIS — Z23 INFLUENZA VACCINATION ADMINISTERED AT CURRENT VISIT: ICD-10-CM

## 2023-02-15 DIAGNOSIS — Z11.59 NEED FOR HEPATITIS C SCREENING TEST: ICD-10-CM

## 2023-02-15 DIAGNOSIS — Z11.4 SCREENING FOR HIV (HUMAN IMMUNODEFICIENCY VIRUS): ICD-10-CM

## 2023-02-15 DIAGNOSIS — E78.2 MIXED HYPERLIPIDEMIA: ICD-10-CM

## 2023-02-15 DIAGNOSIS — R73.03 PREDIABETES: ICD-10-CM

## 2023-02-15 DIAGNOSIS — F25.1 SCHIZOAFFECTIVE DISORDER, DEPRESSIVE TYPE (HCC): Primary | ICD-10-CM

## 2023-02-15 DIAGNOSIS — Z12.31 BREAST CANCER SCREENING BY MAMMOGRAM: ICD-10-CM

## 2023-02-15 RX ORDER — LISINOPRIL 5 MG/1
5 TABLET ORAL DAILY
Qty: 90 TABLET | Refills: 3 | Status: SHIPPED | OUTPATIENT
Start: 2023-02-15

## 2023-02-15 NOTE — PATIENT INSTRUCTIONS
Patient is here to establish care  She recently saw my colleague Jacquelyn Wright at [facility] who identified her as having Takotsubo cardiomyopathy in 2020  Her heart function has returned to normal   She now does not need the metoprolol anymore as she does not have any heart failure  Her blood pressure remains elevated and I put her back on the lisinopril 5 mg at night  This will ensure that a nocturnal dip will occur  She has a history of prediabetes and we are going to assess her for any advancement in her fasting blood sugars  We are also going to check her lipids to see if the atorvastatin is the correct dose as its been almost 2 years since she had her last lipids drawn  Patient received a flu shot today  We ordered a mammogram   She is also going to be screened as a one-time screen for hepatitis C and HIV  I will see her in 1 month to do her Medicare annual wellness and this will be the initial visit  Patient has a history of schizoaffective disorder who she sees Dr Lawyer Prater and she is well managed and is doing quite well

## 2023-02-15 NOTE — PROGRESS NOTES
Assessment/Plan:       1  Schizoaffective disorder, depressive type ([facility] )    2  Takotsubo cardiomyopathy    3  BMI 40 0-44 9, adult ([facility] )    4  Essential hypertension  -     lisinopril (ZESTRIL) 5 mg tablet; Take 1 tablet (5 mg total) by mouth daily  -     Comprehensive metabolic panel; Future    5  Atherosclerotic PVD with intermittent claudication ([facility] )    6  Mixed hyperlipidemia  -     Lipid panel; Future    7  Prediabetes  -     HEMOGLOBIN A1C W/ EAG ESTIMATION; Future    8  Need for hepatitis C screening test  -     Hepatitis C Antibody ([facility]); Future    9  Screening for HIV (human immunodeficiency virus)  -     HIV 1/2 AG/AB w Reflex [facility] for 2 yr old and above; Future    10  Breast cancer screening by mammogram  -     Mammo screening bilateral w 3d & cad; Future; Expected date: 02/15/2023    11  Influenza vaccination administered at current visit  -     influenza vaccine, quadrivalent, 0 5 mL, preservative-free, for adult and pediatric patients 6 mos+ (AFLURIA, FLUARIX, FLULAVAL, FLUZONE)      This 56-year-old female is establishing care for primary care  She previously saw a colleague of connor, Dr Travis Schmidt at [facility]  In 2020, patient used methamphetamine and was using a high dose of nicotine via vaping and sustained Takotsubo cardiomyopathy  She had been treated for heart failure in the past and recent echocardiogram showed her heart to be functioning quite well  Her left ventricular ejection fraction is 55 to 60% with no wall motion abnormalities  Patient was therefore taken off the metoprolol  Patient is complaining of right calf claudication that occurs at distinct distances one half lap around the track to 1 lap around the track  She is being maintained on aspirin and atorvastatin  Doppler duplex studies were previously performed when she had an abnormal ankle-brachial index and these could not be interpreted    There is a question as to whether she indeed has claudication that is vascular or neurogenic  She is going to follow with Dr Olvin Lawrence have this test repeated in the near future  Patient was told she has prediabetes  She wants to be assessed for diabetes  We are also going to repeat her lipid profile and her CMP  He has a history of essential hypertension but had not been taking her lisinopril as it was recently discontinued  Her last visit was after she took her lisinopril and I have restarted her and asked her to send me her blood pressure readings either via [employer] or calling the office  Patient was agreeing to get hepatitis C and HIV one-time screen  I have also ordered a mammogram     Patient has a history of schizoaffective disorder along with major depression  She has feelings of fear of people and knows that she is misinterpreting some social interactions  Abilify helps to control the symptoms  When the symptoms flare, she tries to extricate herself from the situations  She had some worsening of depression in December 202 2  She had an increase in her Zoloft dose and this has improved her mood  She had a score of 1 on the initial depression screening today  Patient's BMI is elevated at 44  BMI is above normal  Nutrition recommendations include reducing portion sizes, decreasing overall calorie intake, reducing fast food intake and consuming healthier snacks  Exercise recommendations include exercising 3-5 times per week  A total of 70 minutes was spent rendering care for this patient  This time included review of the patient's electronic medical record, performing the history and physical, reviewing appropriate labs and/or images, developing a treatment and assessment plan, answering patient's questions and concerns, and documenting the patient visit  I will see the patient in 1 month to perform her initial Medicare wellness exam   Subjective:      Patient ID: Sarah Concepcion is a 41 y o  female      HPI: This 77-year-old female who is on Medicare due to her schizoaffective disorder  She is working for [employer] part-time 5 to 14 hours/week with her hours being very flexible according to her mental state  She is raising her daughter  She no longer fears retribution from her previous partner as he is currently in detention  There is no active PFA order as he is incapable of getting to her while being in detention  Patient has not had any recurrence of her pain in her chest   She denies any shortness of breath  She is trying to increase her exercise  Walking makes her claudication worse but as the walking distance improves, she has less claudication  She can actually palpate the area that is sore in the lateral right calf  She denies any peripheral edema  She denies any trauma to that leg  She describes the pain as a charley horse and cramping sensation that goes away when she stops activity  The time to recover is about 20 seconds  She is informed her psychiatrist Dr Garland Anderson that she stopped taking the Lamictal because it did not make her feel well  He has not substituted another medication and she is maintained on Zoloft and Abilify  She sees Dr Garland Anderson on a regular basis and has been treated by him since 2005  Patient denies any recent URI or viral syndrome  She denies any easy bruising or bleeding  The following portions of the patient's history were reviewed and updated as appropriate: allergies, current medications, past family history, past medical history, past social history, past surgical history, and problem list     Review of Systems  Patient has very irregular menses often going 9 months between menstrual cycles  She did have some hormonal assessment that showed that she was not yet menopausal   She is going to follow with her gynecologist and really does feel that she is at the point where she is now in menopause  Patient denies recent illnesses    She feels that she is coping very well with her schizoaffective disorder  She states that when her feelings become very intense that she will just need to get away and be alone rather than act on impulse  Objective:      /96 (BP Location: Right arm, Patient Position: Sitting)   Pulse 89   Temp 98 9 °F (37 2 °C) (Tympanic)   Ht 5' 6" (1 676 m)   Wt 124 kg (272 lb 14 9 oz)   LMP 12/24/2020   SpO2 98%   BMI 44 05 kg/m²          Physical Exam  Well-developed obese 57-year-old female who is alert oriented and appropriate and answering questions  She is cooperative with the exam   As this is the initial visit, full exam will be done at her next visit  Patient does not have carotid bruits  Patient's heart was regular rate without murmur rub or gallops  Lungs are clear to auscultation  Abdomen is obese and soft

## 2023-02-24 ENCOUNTER — APPOINTMENT (OUTPATIENT)
Dept: LAB | Facility: CLINIC | Age: 48
End: 2023-02-24

## 2023-02-24 DIAGNOSIS — Z11.4 SCREENING FOR HIV (HUMAN IMMUNODEFICIENCY VIRUS): ICD-10-CM

## 2023-02-24 DIAGNOSIS — E78.2 MIXED HYPERLIPIDEMIA: ICD-10-CM

## 2023-02-24 DIAGNOSIS — Z11.59 NEED FOR HEPATITIS C SCREENING TEST: ICD-10-CM

## 2023-02-24 DIAGNOSIS — R73.03 PREDIABETES: ICD-10-CM

## 2023-02-24 DIAGNOSIS — I10 ESSENTIAL HYPERTENSION: ICD-10-CM

## 2023-02-24 LAB
ALBUMIN SERPL BCP-MCNC: 3.5 G/DL (ref 3.5–5)
ALP SERPL-CCNC: 61 U/L (ref 46–116)
ALT SERPL W P-5'-P-CCNC: 20 U/L (ref 12–78)
ANION GAP SERPL CALCULATED.3IONS-SCNC: 6 MMOL/L (ref 4–13)
AST SERPL W P-5'-P-CCNC: 17 U/L (ref 5–45)
BILIRUB SERPL-MCNC: 0.47 MG/DL (ref 0.2–1)
BUN SERPL-MCNC: 14 MG/DL (ref 5–25)
CALCIUM SERPL-MCNC: 9.4 MG/DL (ref 8.3–10.1)
CHLORIDE SERPL-SCNC: 108 MMOL/L (ref 96–108)
CHOLEST SERPL-MCNC: 140 MG/DL
CO2 SERPL-SCNC: 25 MMOL/L (ref 21–32)
CREAT SERPL-MCNC: 0.7 MG/DL (ref 0.6–1.3)
GFR SERPL CREATININE-BSD FRML MDRD: 104 ML/MIN/1.73SQ M
GLUCOSE P FAST SERPL-MCNC: 85 MG/DL (ref 65–99)
HCV AB SER QL: NORMAL
HDLC SERPL-MCNC: 51 MG/DL
HIV 1+2 AB+HIV1 P24 AG SERPL QL IA: NORMAL
HIV 2 AB SERPL QL IA: NORMAL
HIV1 AB SERPL QL IA: NORMAL
HIV1 P24 AG SERPL QL IA: NORMAL
LDLC SERPL CALC-MCNC: 82 MG/DL (ref 0–100)
NONHDLC SERPL-MCNC: 89 MG/DL
POTASSIUM SERPL-SCNC: 4.2 MMOL/L (ref 3.5–5.3)
PROT SERPL-MCNC: 7.2 G/DL (ref 6.4–8.4)
SODIUM SERPL-SCNC: 139 MMOL/L (ref 135–147)
TRIGL SERPL-MCNC: 37 MG/DL

## 2023-02-25 LAB
EST. AVERAGE GLUCOSE BLD GHB EST-MCNC: 103 MG/DL
HBA1C MFR BLD: 5.2 %

## 2023-03-08 ENCOUNTER — RA CDI HCC (OUTPATIENT)
Dept: OTHER | Facility: HOSPITAL | Age: 48
End: 2023-03-08

## 2023-03-08 NOTE — PROGRESS NOTES
[id number]  [facility]  coding opportunities          Chart Reviewed number of suggestions sent to Provider: 1     Patients Insurance     Medicare Insurance: [employer]

## 2023-03-16 ENCOUNTER — OFFICE VISIT (OUTPATIENT)
Dept: FAMILY MEDICINE CLINIC | Facility: CLINIC | Age: 48
End: 2023-03-16

## 2023-03-16 VITALS
SYSTOLIC BLOOD PRESSURE: 124 MMHG | HEIGHT: 66 IN | BODY MASS INDEX: 42.06 KG/M2 | WEIGHT: 261.69 LBS | TEMPERATURE: 98.9 F | HEART RATE: 101 BPM | OXYGEN SATURATION: 98 % | DIASTOLIC BLOOD PRESSURE: 82 MMHG

## 2023-03-16 DIAGNOSIS — I10 ESSENTIAL HYPERTENSION: ICD-10-CM

## 2023-03-16 DIAGNOSIS — L72.0 INCLUSION CYST: ICD-10-CM

## 2023-03-16 DIAGNOSIS — E78.2 MIXED HYPERLIPIDEMIA: ICD-10-CM

## 2023-03-16 DIAGNOSIS — Z00.00 MEDICARE ANNUAL WELLNESS VISIT, INITIAL: Primary | ICD-10-CM

## 2023-03-16 DIAGNOSIS — F25.1 SCHIZOAFFECTIVE DISORDER, DEPRESSIVE TYPE (HCC): ICD-10-CM

## 2023-03-16 DIAGNOSIS — I70.219 ATHEROSCLEROTIC PVD WITH INTERMITTENT CLAUDICATION (HCC): ICD-10-CM

## 2023-03-16 DIAGNOSIS — I42.9 IDIOPATHIC CARDIOMYOPATHY (HCC): ICD-10-CM

## 2023-03-16 DIAGNOSIS — F19.10 SUBSTANCE ABUSE (HCC): ICD-10-CM

## 2023-03-16 NOTE — PROGRESS NOTES
Assessment and Plan:     Problem List Items Addressed This Visit    None       Preventive health issues were discussed with patient, and age appropriate screening tests were ordered as noted in patient's After Visit Summary  Personalized health advice and appropriate referrals for health education or preventive services given if needed, as noted in patient's After Visit Summary  History of Present Illness:     Patient presents for a Medicare Wellness Visit    HPI   Patient Care Team:  Zaida Evans PA-C as PCP - General (Physician Assistant)     Review of Systems:     Review of Systems     Problem List:     Patient Active Problem List   Diagnosis   • Atherosclerotic PVD with intermittent claudication [facility])      Past Medical and Surgical History:     Past Medical History:   Diagnosis Date   • Depression    • Hyperlipemia      History reviewed  No pertinent surgical history  Family History:     Family History   Problem Relation Age of Onset   • No Known Problems Mother    • No Known Problems Father       Social History:     Social History     Socioeconomic History   • Marital status: Single     Spouse name: None   • Number of children: None   • Years of education: None   • Highest education level: None   Occupational History   • None   Tobacco Use   • Smoking status: Former     Types: Cigarettes   • Smokeless tobacco: Never   Vaping Use   • Vaping Use: Former   Substance and Sexual Activity   • Alcohol use: Not Currently   • Drug use: Not Currently   • Sexual activity: Not Currently   Other Topics Concern   • None   Social History Narrative   • None     Social Determinants of Health     Financial Resource Strain: Low Risk    • Difficulty of Paying Living Expenses: Not very hard   Food Insecurity: Not on file   Transportation Needs: No Transportation Needs   • Lack of Transportation (Medical): No   • Lack of Transportation (Non-Medical):  No   Physical Activity: Not on file   Stress: Not on file Social Connections: Not on file   Intimate Partner Violence: Not on file   Housing Stability: Not on file      Medications and Allergies:     Current Outpatient Medications   Medication Sig Dispense Refill   • ARIPiprazole (ABILIFY) 15 mg tablet      • ARIPiprazole (ABILIFY) 30 mg tablet      • atorvastatin (LIPITOR) 40 mg tablet Take 40 mg by mouth daily     • lamoTRIgine (LaMICtal) 150 MG tablet      • lisinopril (ZESTRIL) 5 mg tablet Take 1 tablet (5 mg total) by mouth daily 90 tablet 3   • sertraline (ZOLOFT) 100 mg tablet Take 100 mg by mouth daily       No current facility-administered medications for this visit       Allergies   Allergen Reactions   • Ambrosia Artemisiifolia (Ragweed) Skin Test Itching and Swelling   • Cat Hair Extract Other (See Comments)   • Dog Epithelium Allergy Skin Test Other (See Comments)   • Metronidazole Facial Swelling     face swells   • Molds & Smuts Swelling   • Other Other (See Comments) and Itching   • Sulfa Antibiotics Facial Swelling and Other (See Comments)      Immunizations:     Immunization History   Administered Date(s) Administered   • COVID-19 MODERNA VACC 0 5 ML IM 04/30/2021, 07/14/2021   • INFLUENZA 09/26/2014, 02/15/2023   • Influenza Quadrivalent 3 years and older 09/26/2014   • Influenza Quadrivalent Preservative Free 3 years and older IM 10/21/2021   • Influenza, injectable, quadrivalent, preservative free 0 5 mL 02/15/2023   • Tdap 08/29/2012, 10/17/2014      Health Maintenance:         Topic Date Due   • Breast Cancer Screening: Mammogram  10/18/2022   • Colorectal Cancer Screening  12/01/2027 (Originally 8/14/2022)   • Cervical Cancer Screening  10/12/2026   • HIV Screening  Completed   • Hepatitis C Screening  Completed         Topic Date Due   • Pneumococcal Vaccine: Pediatrics (0 to 5 Years) and At-Risk Patients (6 to 59 Years) (1 - PCV) Never done   • COVID-19 Vaccine (3 - Booster for Moderna series) 09/08/2021      Medicare Screening Tests and Risk Assessments:         Health Risk Assessment:   Patient rates overall health as good  Patient feels that their physical health rating is much better  Patient is dissatisfied with their life  Eyesight was rated as same  Hearing was rated as same  Patient feels that their emotional and mental health rating is slightly better  Patients states they are never, rarely angry  Patient states they are often unusually tired/fatigued  Pain experienced in the last 7 days has been some  Patient's pain rating has been 3/10  Patient states that she has experienced no weight loss or gain in last 6 months  Pain fluctuates from a 0 to a 5- depends on what she is doing  She states she has arthritis  Fall Risk Screening: In the past year, patient has experienced: no history of falling in past year      Urinary Incontinence Screening:   Patient has leaked urine accidently in the last six months  Patient states when she coughs- yes  Home Safety:  Patient does not have trouble with stairs inside or outside of their home  Patient has working smoke alarms and has no working carbon monoxide detector  Home safety hazards include: medications that cause fatigue and household clutter  She states she has medications that she takes at night that make her tired  Nutrition:   Current diet is Limited junk food and Regular  States she is doing intermittent fasting in attempt to lose some weights  Medications:   Patient is currently taking over-the-counter supplements  OTC medications include: see medication list  Patient is able to manage medications  Activities of Daily Living (ADLs)/Instrumental Activities of Daily Living (IADLs):   Walk and transfer into and out of bed and chair?: Yes  Dress and groom yourself?: Yes    Bathe or shower yourself?: Yes    Feed yourself?  Yes  Do your laundry/housekeeping?: Yes  Manage your money, pay your bills and track your expenses?: Yes  Make your own meals?: Yes    Do your own shopping?: Yes    Previous Hospitalizations:   Any hospitalizations or ED visits within the last 12 months?: No      PREVENTIVE SCREENINGS      Cardiovascular Screening:    General: Screening Not Indicated and History Lipid Disorder      Diabetes Screening:     General: Screening Current      Breast Cancer Screening:     General: Screening Current      Cervical Cancer Screening:    General: Screening Current      Lung Cancer Screening:     General: Screening Not Indicated      Hepatitis C Screening:    General: Screening Current    Screening, Brief Intervention, and Referral to Treatment (SBIRT)    Screening  Typical number of drinks in a day: 0  Typical number of drinks in a week: 0  Interpretation: Low risk drinking behavior  Single Item Drug Screening:  How often have you used an illegal drug (including marijuana) or a prescription medication for non-medical reasons in the past year? never    Single Item Drug Screen Score: 0  Interpretation: Negative screen for possible drug use disorder    No results found       Physical Exam:     Wt 119 kg (261 lb 11 oz)   LMP 12/24/2020   BMI 42 24 kg/m²     Physical Exam     Veronique Alvarez PA-C

## 2023-03-16 NOTE — PROGRESS NOTES
Assessment and Plan:     Problem List Items Addressed This Visit        Cardiovascular and Mediastinum    Atherosclerotic PVD with intermittent claudication ([facility] )   Other Visit Diagnoses     Medicare annual wellness visit, initial    -  Primary    Schizoaffective disorder, depressive type ([place] [address] )        Substance abuse ([place] [address] )        Mixed hyperlipidemia        Essential hypertension        Idiopathic cardiomyopathy ([place] [address] )        BMI 40 0-44 9, adult ([facility] )        Inclusion cyst               Preventive health issues were discussed with patient, and age appropriate screening tests were ordered as noted in patient's After Visit Summary  Personalized health advice and appropriate referrals for health education or preventive services given if needed, as noted in patient's After Visit Summary  History of Present Illness:     Patient presents for a Medicare Wellness Visit    HPI reviewed patient's response to Medicare annual wellness visit  Patient also had complaints of several cysts located in the dorsal left wrist anterior abdomen and posterior left humerus  These are consistent with inclusion cysts and are nonpathologic  Reassured patient to the benign nature of the cysts  Congratulated patient on losing 11 pounds since last visit  She has been doing intermittent fasting and this is working for her  Patient Care Team:  Donny Perdomo PA-C as PCP - General (Physician Assistant)     Review of Systems:     Review of Systems: Patient denies recent URI or viral syndrome  Her daughter who is in second grade did have a viral illness with fever this week but has since recovered  Patient feels safe in her home environment  The father of her child remains in [employer]  She does not know how long the incarceration is going to last nor does she know what will happen when he gets out of [employer]    He intermittently calls the family and does speak to the daughter on speaker phone so that nothing inappropriate is being said  Problem List:     Patient Active Problem List   Diagnosis   • Atherosclerotic PVD with intermittent claudication [facility])      Past Medical and Surgical History:     Past Medical History:   Diagnosis Date   • Depression    • Hyperlipemia      History reviewed  No pertinent surgical history  Family History:     Family History   Problem Relation Age of Onset   • No Known Problems Mother    • No Known Problems Father       Social History:     Social History     Socioeconomic History   • Marital status: Single     Spouse name: None   • Number of children: None   • Years of education: None   • Highest education level: None   Occupational History   • None   Tobacco Use   • Smoking status: Former     Types: Cigarettes   • Smokeless tobacco: Never   Vaping Use   • Vaping Use: Former   Substance and Sexual Activity   • Alcohol use: Not Currently   • Drug use: Not Currently   • Sexual activity: Not Currently   Other Topics Concern   • None   Social History Narrative   • None     Social Determinants of Health     Financial Resource Strain: Low Risk    • Difficulty of Paying Living Expenses: Not very hard   Food Insecurity: Not on file   Transportation Needs: No Transportation Needs   • Lack of Transportation (Medical): No   • Lack of Transportation (Non-Medical):  No   Physical Activity: Not on file   Stress: Not on file   Social Connections: Not on file   Intimate Partner Violence: Not on file   Housing Stability: Not on file      Medications and Allergies:     Current Outpatient Medications   Medication Sig Dispense Refill   • ARIPiprazole (ABILIFY) 15 mg tablet      • ARIPiprazole (ABILIFY) 30 mg tablet      • atorvastatin (LIPITOR) 40 mg tablet Take 40 mg by mouth daily     • lamoTRIgine (LaMICtal) 150 MG tablet      • lisinopril (ZESTRIL) 5 mg tablet Take 1 tablet (5 mg total) by mouth daily 90 tablet 3   • sertraline (ZOLOFT) 100 mg tablet Take 100 mg by mouth daily       No current facility-administered medications for this visit  Allergies   Allergen Reactions   • Ambrosia Artemisiifolia (Ragweed) Skin Test Itching and Swelling   • Cat Hair Extract Other (See Comments)   • Dog Epithelium Allergy Skin Test Other (See Comments)   • Metronidazole Facial Swelling     face swells   • Molds & Smuts Swelling   • Other Other (See Comments) and Itching   • Sulfa Antibiotics Facial Swelling and Other (See Comments)      Immunizations:     Immunization History   Administered Date(s) Administered   • COVID-19 MODERNA VACC 0 5 ML IM 04/30/2021, 07/14/2021   • INFLUENZA 09/26/2014, 02/15/2023   • Influenza Quadrivalent 3 years and older 09/26/2014   • Influenza Quadrivalent Preservative Free 3 years and older IM 10/21/2021   • Influenza, injectable, quadrivalent, preservative free 0 5 mL 02/15/2023   • Tdap 08/29/2012, 10/17/2014      Health Maintenance:         Topic Date Due   • Breast Cancer Screening: Mammogram  10/18/2022   • Colorectal Cancer Screening  12/01/2027 (Originally 8/14/2022)   • Cervical Cancer Screening  10/12/2026   • HIV Screening  Completed   • Hepatitis C Screening  Completed         Topic Date Due   • Pneumococcal Vaccine: Pediatrics (0 to 5 Years) and At-Risk Patients (6 to 64 Years) (1 - PCV) Never done   • COVID-19 Vaccine (3 - Booster for Moderna series) 09/08/2021      Medicare Screening Tests and Risk Assessments:     Annual Wellness Visit: See response to questions  No results found  Physical Exam:     /82 (BP Location: Right arm, Patient Position: Sitting)   Pulse 101   Temp 98 9 °F (37 2 °C) (Tympanic)   Ht 5' 6" (1 676 m)   Wt 119 kg (261 lb 11 oz)   LMP 12/24/2020   SpO2 98%   BMI 42 24 kg/m²     Physical Exam patient has small inclusion cyst located dorsal left wrist midline of abdomen and posterior left humerus  Patient's heart is regular rate without murmur rub or gallops  Lungs are clear to auscultation  Abdomen round soft obese  No peripheral edema      Jane Lown Kayleigh Weston PA-C

## 2023-03-16 NOTE — PATIENT INSTRUCTIONS
Patient is here for her Medicare wellness exam and this is her initial exam   Patient is doing well and is taking her medications as prescribed  She now only has a 0 6% risk of arteriosclerotic heart disease which is excellent as long as she is continuing to take her atorvastatin and her baby aspirin  This is also helping with the claudication or the pain in the calf that she gets with walking  I will see her in 3 months  Congratulations to her losing 11 pounds with intermittent fasting this seems to be working and I would continue to have her do something that she is getting a good effect with  This is also brought her blood pressure down nicely to 124/82  She continues to take the lisinopril at night  I will see her in 3 months or sooner if needed  She is following with her psychiatrist Dr Megha Garcia today who is treating her schizoaffective disorder  Symptoms are well controlled and she is maintained on Zoloft

## 2023-03-18 ENCOUNTER — OFFICE VISIT (OUTPATIENT)
Dept: URGENT CARE | Facility: CLINIC | Age: 48
End: 2023-03-18

## 2023-03-18 VITALS
WEIGHT: 263 LBS | OXYGEN SATURATION: 97 % | DIASTOLIC BLOOD PRESSURE: 85 MMHG | HEART RATE: 96 BPM | RESPIRATION RATE: 16 BRPM | SYSTOLIC BLOOD PRESSURE: 112 MMHG | TEMPERATURE: 97.5 F | HEIGHT: 66 IN | BODY MASS INDEX: 42.27 KG/M2

## 2023-03-18 DIAGNOSIS — J02.9 SORE THROAT: ICD-10-CM

## 2023-03-18 DIAGNOSIS — J02.0 STREP PHARYNGITIS: Primary | ICD-10-CM

## 2023-03-18 LAB — S PYO AG THROAT QL: POSITIVE

## 2023-03-18 RX ORDER — AMOXICILLIN 500 MG/1
500 CAPSULE ORAL EVERY 12 HOURS SCHEDULED
Qty: 20 CAPSULE | Refills: 0 | Status: SHIPPED | OUTPATIENT
Start: 2023-03-18 | End: 2023-03-28

## 2023-03-18 NOTE — PATIENT INSTRUCTIONS
I have prescribed an antibiotic for the infection  Please take the antibiotic as prescribed and finish the entire prescription  I recommend that the patient takes an over the counter probiotic or eats yogurt with live cultures in it [place]) to keep good bacteria in the gut and help prevent diarrhea  Wash hands frequently to prevent the spread of infection  Can use over the counter cough and cold medications to help with symptoms  Ibuprofen and/or tylenol as needed for pain or fever  Follow up with PCP in 3-5 days  Proceed to  ER if symptoms worsen

## 2023-03-18 NOTE — PROGRESS NOTES
[facility]        NAME: Luis Simmons is a 41 y o  female  : 1975    MRN: [id number]  DATE: 2023  TIME: 1:30 PM    Assessment and Plan   Strep pharyngitis [J02 0]  1  Strep pharyngitis  amoxicillin (AMOXIL) 500 mg capsule      2  Sore throat  POCT rapid strepA            Patient Instructions     I have prescribed an antibiotic for the infection  Please take the antibiotic as prescribed and finish the entire prescription  I recommend that the patient takes an over the counter probiotic or eats yogurt with live cultures in it [place]) to keep good bacteria in the gut and help prevent diarrhea  Wash hands frequently to prevent the spread of infection  Can use over the counter cough and cold medications to help with symptoms  Ibuprofen and/or tylenol as needed for pain or fever  Follow up with PCP in 3-5 days  Proceed to  ER if symptoms worsen  Chief Complaint     Chief Complaint   Patient presents with   • Sore Throat     C/o sore throat, cough, onset 2 days ago  History of Present Illness       Sore Throat   This is a new problem  Episode onset: 2 days  There has been no fever  Associated symptoms include coughing  Pertinent negatives include no congestion, ear pain, shortness of breath or trouble swallowing  Review of Systems   Review of Systems   Constitutional: Negative for chills, diaphoresis, fatigue and fever  HENT: Positive for postnasal drip and sore throat  Negative for congestion, ear pain, rhinorrhea, sinus pressure and trouble swallowing  Respiratory: Positive for cough  Negative for chest tightness, shortness of breath and wheezing  Cardiovascular: Negative for chest pain and palpitations  Skin: Negative for color change  Neurological: Negative for dizziness and light-headedness  Psychiatric/Behavioral: Negative for sleep disturbance           Current Medications       Current Outpatient Medications:   •  amoxicillin (AMOXIL) 500 mg capsule, Take 1 capsule (500 mg total) by mouth every 12 (twelve) hours for 10 days, Disp: 20 capsule, Rfl: 0  •  ARIPiprazole (ABILIFY) 15 mg tablet, 15 mg if needed, Disp: , Rfl:   •  ARIPiprazole (ABILIFY) 30 mg tablet, , Disp: , Rfl:   •  atorvastatin (LIPITOR) 40 mg tablet, Take 40 mg by mouth daily, Disp: , Rfl:   •  lisinopril (ZESTRIL) 5 mg tablet, Take 1 tablet (5 mg total) by mouth daily, Disp: 90 tablet, Rfl: 3  •  sertraline (ZOLOFT) 100 mg tablet, Take 100 mg by mouth daily, Disp: , Rfl:   •  lamoTRIgine (LaMICtal) 150 MG tablet, , Disp: , Rfl:     Current Allergies     Allergies as of 03/18/2023 - Reviewed 03/18/2023   Allergen Reaction Noted   • Ambrosia artemisiifolia (ragweed) skin test Itching and Swelling 02/08/2023   • Cat hair extract Other (See Comments) 06/10/2021   • Dog epithelium allergy skin test Other (See Comments) 02/08/2023   • Metronidazole Facial Swelling 01/22/2021   • Molds & smuts Swelling 02/08/2023   • Other Other (See Comments) and Itching 06/10/2021   • Sulfa antibiotics Facial Swelling and Other (See Comments) 11/06/2016            The following portions of the patient's history were reviewed and updated as appropriate: allergies, current medications, past family history, past medical history, past social history, past surgical history and problem list      Past Medical History:   Diagnosis Date   • Depression    • Hyperlipemia    • Hypertension        Past Surgical History:   Procedure Laterality Date   • LAPAROSCOPY FOR ECTOPIC PREGNANCY         Family History   Problem Relation Age of Onset   • No Known Problems Mother    • No Known Problems Father          Medications have been verified  Objective   /85   Pulse 96   Temp 97 5 °F (36 4 °C)   Resp 16   Ht 5' 6" (1 676 m)   Wt 119 kg (263 lb)   LMP 12/24/2020   SpO2 97%   BMI 42 45 kg/m²        Physical Exam     Physical Exam  Constitutional:       General: She is not in acute distress       Appearance: Normal appearance  She is not ill-appearing  HENT:      Head: Normocephalic  Right Ear: Tympanic membrane and external ear normal  No drainage or tenderness  Tympanic membrane is not erythematous  Left Ear: Tympanic membrane and external ear normal  No drainage or tenderness  Tympanic membrane is not erythematous  Nose: No congestion  Mouth/Throat:      Mouth: Mucous membranes are moist       Pharynx: Oropharynx is clear  Uvula midline  Posterior oropharyngeal erythema present  No pharyngeal swelling or oropharyngeal exudate  Tonsils: No tonsillar exudate or tonsillar abscesses  2+ on the right  2+ on the left  Cardiovascular:      Rate and Rhythm: Normal rate and regular rhythm  Pulses: Normal pulses  Heart sounds: Normal heart sounds  Pulmonary:      Effort: Pulmonary effort is normal  No respiratory distress  Breath sounds: Normal breath sounds  No stridor  No wheezing, rhonchi or rales  Musculoskeletal:      Cervical back: Normal range of motion  No tenderness  Lymphadenopathy:      Cervical: No cervical adenopathy  Skin:     General: Skin is warm and dry  Neurological:      General: No focal deficit present  Mental Status: She is alert and oriented to person, place, and time  Mental status is at baseline  Psychiatric:         Mood and Affect: Mood normal          Behavior: Behavior normal          Thought Content:  Thought content normal          Judgment: Judgment normal

## 2023-06-07 ENCOUNTER — RA CDI HCC (OUTPATIENT)
Dept: OTHER | Facility: HOSPITAL | Age: 48
End: 2023-06-07

## 2023-06-07 NOTE — PROGRESS NOTES
E66 01   [facility] 75  coding opportunities          Chart Reviewed number of suggestions sent to Provider: 1     Patients Insurance     Medicare Insurance: [employer]

## 2023-06-16 ENCOUNTER — TELEPHONE (OUTPATIENT)
Dept: FAMILY MEDICINE CLINIC | Facility: CLINIC | Age: 48
End: 2023-06-16

## 2023-06-19 ENCOUNTER — TELEPHONE (OUTPATIENT)
Dept: ADMINISTRATIVE | Facility: OTHER | Age: 48
End: 2023-06-19

## 2023-06-19 NOTE — TELEPHONE ENCOUNTER
----- Message from Vineet Sellers sent at 6/16/2023 12:38 PM EDT -----  Regarding: Care Gap Request  06/16/23 12:38 PM    Hello, our patient attached above has had Pap Smear (HPV) aka Cervical Cancer Screening completed/performed  Please assist in updating the patient chart by pulling the document from ENCOUNTERS Tab within Chart Review  The date of service is 03/10/2023       Thank you,  Vineet Sellers  [facility]IMARY [facility]
Upon review of the In Basket request we were able to locate, review, and update the patient chart as requested for Pap Smear (HPV) aka Cervical Cancer Screening  Any additional questions or concerns should be emailed to the Practice Liaisons via the appropriate education email address, please do not reply via In Basket      Thank you  Thai Bravo MA
3 = A little assistance

## 2023-08-04 ENCOUNTER — TELEPHONE (OUTPATIENT)
Dept: ADMINISTRATIVE | Facility: OTHER | Age: 48
End: 2023-08-04

## 2023-08-04 NOTE — TELEPHONE ENCOUNTER
Upon review of the In Basket request we were able to locate, review, and update the patient chart as requested for Mammogram.    Any additional questions or concerns should be emailed to the Practice Liaisons via the appropriate education email address, please do not reply via In Basket.     Thank you  Justyna Luna MA

## 2023-08-04 NOTE — TELEPHONE ENCOUNTER
----- Message from Megan Esposito sent at 8/4/2023 10:20 AM EDT -----  Regarding: Mammogram  08/04/23 10:21 AM    Hello, our patient Ranjeet Andres has had Mammogram completed/performed. Please assist in updating the patient chart by pulling the document from Tab within Chart Review. The date of service is 3/29/2023.      Thank you,  Megan Esposito  [facility]

## 2023-08-09 ENCOUNTER — APPOINTMENT (OUTPATIENT)
Dept: LAB | Facility: CLINIC | Age: 48
End: 2023-08-09
Payer: MEDICARE

## 2023-08-09 ENCOUNTER — OFFICE VISIT (OUTPATIENT)
Dept: FAMILY MEDICINE CLINIC | Facility: CLINIC | Age: 48
End: 2023-08-09
Payer: MEDICARE

## 2023-08-09 VITALS
HEIGHT: 66 IN | HEART RATE: 90 BPM | WEIGHT: 264.33 LBS | OXYGEN SATURATION: 95 % | SYSTOLIC BLOOD PRESSURE: 124 MMHG | DIASTOLIC BLOOD PRESSURE: 68 MMHG | BODY MASS INDEX: 42.48 KG/M2 | TEMPERATURE: 97.4 F

## 2023-08-09 DIAGNOSIS — F25.1 SCHIZOAFFECTIVE DISORDER, DEPRESSIVE TYPE (HCC): ICD-10-CM

## 2023-08-09 DIAGNOSIS — F31.32 BIPOLAR AFFECTIVE DISORDER, CURRENTLY DEPRESSED, MODERATE (HCC): ICD-10-CM

## 2023-08-09 DIAGNOSIS — R53.83 FATIGUE, UNSPECIFIED TYPE: ICD-10-CM

## 2023-08-09 DIAGNOSIS — I70.219 ATHEROSCLEROTIC PVD WITH INTERMITTENT CLAUDICATION (HCC): Primary | ICD-10-CM

## 2023-08-09 DIAGNOSIS — I10 ESSENTIAL HYPERTENSION: ICD-10-CM

## 2023-08-09 DIAGNOSIS — I42.9 IDIOPATHIC CARDIOMYOPATHY (HCC): ICD-10-CM

## 2023-08-09 DIAGNOSIS — N95.1 PERIMENOPAUSAL SYMPTOMS: ICD-10-CM

## 2023-08-09 DIAGNOSIS — E78.2 MIXED HYPERLIPIDEMIA: ICD-10-CM

## 2023-08-09 LAB — TSH SERPL DL<=0.05 MIU/L-ACNC: 2.54 UIU/ML (ref 0.45–4.5)

## 2023-08-09 PROCEDURE — 36415 COLL VENOUS BLD VENIPUNCTURE: CPT

## 2023-08-09 PROCEDURE — 99214 OFFICE O/P EST MOD 30 MIN: CPT | Performed by: PHYSICIAN ASSISTANT

## 2023-08-09 PROCEDURE — 84443 ASSAY THYROID STIM HORMONE: CPT

## 2023-08-09 RX ORDER — LISINOPRIL 5 MG/1
5 TABLET ORAL DAILY
Qty: 90 TABLET | Refills: 3 | Status: SHIPPED | OUTPATIENT
Start: 2023-08-09

## 2023-08-09 NOTE — PATIENT INSTRUCTIONS
Patient is interested in losing weight. She is going to try the free sissy on her smart phone called lose it. She will track everything that she eats along with every exercise that she is doing. This sissy has the advantage that if she goes to a restaurant or eats a [employer] she can actually plug-in the food and get the caloric in the nutrition value for that. She will not need to buy the paid version she should do very well with just the free version. I will see her in 4 months and I hope to have seen at least a 6 to 8 pound weight loss by that time.

## 2023-08-09 NOTE — PROGRESS NOTES
Assessment/Plan:       1. Atherosclerotic PVD with intermittent claudication ([address])    2. Mixed hyperlipidemia    3. Idiopathic cardiomyopathy ([address])    4. BMI 40.0-44.9, adult ([address])    5. Essential hypertension  -     lisinopril (ZESTRIL) 5 mg tablet; Take 1 tablet (5 mg total) by mouth daily    6. Fatigue, unspecified type  -     TSH, 3rd generation with Free T4 reflex; Future    7. Perimenopausal symptoms    8. Bipolar affective disorder, currently depressed, moderate ([address])    9. Schizoaffective disorder, depressive type [facility])      This 28-year-old female sees me for her primary care services. She would like to lose weight. She states that she gained 30 pounds during menopause. She is still continuing to have perimenopausal symptoms of feeling warm and having difficulty concentrating. She did have 2 menstrual cycles this year so she is not fully through menopause. BMI is above normal. Nutrition recommendations include reducing portion sizes, decreasing overall calorie intake, 3-5 servings of fruits/vegetables daily and reducing fast food intake. Exercise recommendations include moderate aerobic physical activity for 150 minutes/week. Is going to try to exercise 1 hour/day every day of the week. Patient no longer has any cardiac restrictions. It appeared that she developed Takotsubo cardiomyopathy as a result of vaping. Her most recent echocardiogram showed 55% ejection fraction. She is no longer vaping. She is exercising more and has less pain with claudication in the right calf. She is also stretching out the calf which seems to help the pain. She is adherent with the atorvastatin and the lisinopril. She is taking both at night. For her schizoaffective disorder and bipolar disorder, she modifies the amount of Abilify that she takes. When she is having a bad day, she will take the full 45 mg dosing otherwise she takes 30 mg. She also feels that sertraline is really helping with her depression.   I performed a PHQ 2 score on her today and the score was 2. This is a great improvement from the low mood she has had in the past.    Patient is also complaining of a lot of fatigue. She is carrying around a lot more weight than she had in the past and I think that weight loss along with increasing activity can help with the fatigue. We are checking a TSH to make sure that the thyroid is not contributing. A total of 34 minutes was spent rendering care for this patient. This time included review of the patient's electronic medical record, performing the history and physical, reviewing appropriate labs and/or images, developing a treatment and assessment plan, answering patient's questions and concerns, and documenting the patient visit. I will see her in 4 months to assess her response to weight loss attempts. I did suggest to lose that application for her smart phone. Subjective:      Patient ID: Tin Winslow is a 41 y.o. female. HPI: Patient is feeling warm most of the time. She believes that she is having some perimenopausal symptoms and her menses have become more scant. Her warmth can occur at any time. She is also having difficulty with concentration. She states that her mood is improving with the treatment of Abilify and sertraline. She is adherent with her medications including her lipid-lowering agents and hypertension agents. She now has a 0.5% ASCVD risk as a result of the atorvastatin therapy. She does have a history of cardiomyopathy secondary to vaping and did develop a Takotsubo condition and now her heart seems to be back in normal sequencing. She was suggested to continue with the lipid-lowering therapy as a result of her previous history of cardiomyopathy. She was told the cardiomyopathy was probably directly related to vaping and she is stop vaping. She was seen in the office today with her daughter and they seem to have a great relationship.   Her daughter is going back to school so this will give her an opportunity to walk while her daughter is attending third grade. She is not complaining of a lot of calf pain that she had in the past.  She denies dizziness lightheadedness chest pain palpitations syncope or presyncope. The following portions of the patient's history were reviewed and updated as appropriate: allergies, current medications, past family history, past medical history, past social history, past surgical history, and problem list.    Review of Systems  No recent URI or viral syndrome. Objective:      /68 (BP Location: Left arm, Patient Position: Sitting, Cuff Size: Large)   Pulse 90   Temp (!) 97.4 °F (36.3 °C) (Tympanic)   Ht 5' 6" (1.676 m)   Wt 120 kg (264 lb 5.3 oz)   SpO2 95%   BMI 42.66 kg/m²          Physical Exam  An overweight pleasant 43-year-old female who is very cooperative with the exam.  She appropriately answers questions. She is able to maintain good eye contact. Her mood seems to be improved today. Patient's heart is regular rate without murmur rub or gallops. Lungs are clear to auscultation.

## 2023-10-25 ENCOUNTER — HOSPITAL ENCOUNTER (OUTPATIENT)
Dept: RADIOLOGY | Facility: CLINIC | Age: 48
Discharge: HOME | End: 2023-10-25
Admitting: FAMILY MEDICINE
Payer: MEDICARE

## 2023-10-25 ENCOUNTER — OFFICE VISIT (OUTPATIENT)
Dept: URGENT CARE | Facility: CLINIC | Age: 48
End: 2023-10-25
Payer: MEDICARE

## 2023-10-25 VITALS
DIASTOLIC BLOOD PRESSURE: 80 MMHG | OXYGEN SATURATION: 96 % | RESPIRATION RATE: 16 BRPM | BODY MASS INDEX: 43.07 KG/M2 | HEART RATE: 106 BPM | HEIGHT: 66 IN | WEIGHT: 268 LBS | TEMPERATURE: 98 F | SYSTOLIC BLOOD PRESSURE: 112 MMHG

## 2023-10-25 DIAGNOSIS — R05.1 ACUTE COUGH: Primary | ICD-10-CM

## 2023-10-25 DIAGNOSIS — R05.1 ACUTE COUGH: ICD-10-CM

## 2023-10-25 PROCEDURE — G0463 HOSPITAL OUTPT CLINIC VISIT: HCPCS

## 2023-10-25 PROCEDURE — 71046 X-RAY EXAM CHEST 2 VIEWS: CPT

## 2023-10-25 PROCEDURE — 99213 OFFICE O/P EST LOW 20 MIN: CPT

## 2023-10-25 RX ORDER — BENZONATATE 100 MG/1
100 CAPSULE ORAL 3 TIMES DAILY PRN
Qty: 20 CAPSULE | Refills: 0 | Status: SHIPPED | OUTPATIENT
Start: 2023-10-25 | End: 2023-11-01

## 2023-10-25 RX ORDER — PREDNISONE 20 MG/1
40 TABLET ORAL DAILY
Qty: 10 TABLET | Refills: 0 | Status: SHIPPED | OUTPATIENT
Start: 2023-10-25 | End: 2023-10-30

## 2023-10-25 NOTE — PATIENT INSTRUCTIONS
Preliminary XR read negative, final read pending  Take oral steroids as prescribed  Continue with supportive measures, OTC Tylenol/Ibuprofen, nasal decongestants, and cough suppressants (Tessalon Perles)  Cool mist humidifiers, throat lozenges, increased fluid intake and rest   Follow up with PCP in 3-5 days  Present to ER if symptoms worsen   Acute Cough   AMBULATORY CARE:   An acute cough  can last up to 3 weeks. Common causes of an acute cough include a cold, allergies, or a lung infection. Seek care immediately if:   You have trouble breathing or feel short of breath. You cough up blood, or you see blood in your mucus. You faint or feel weak or dizzy. You have chest pain when you cough or take a deep breath. You have new wheezing. Contact your healthcare provider if:   You have a fever. Your cough lasts longer than 4 weeks. Your symptoms do not improve with treatment. You have questions or concerns about your condition or care. Treatment:  An acute cough usually goes away on its own. Ask your healthcare provider about medicines you can take to decrease your cough. You may need medicine to stop the cough, decrease swelling in your airways, or help open your airways. Medicine may also be given to help you cough up mucus. If you have an infection caused by bacteria, you may need antibiotics. Manage your symptoms:   Do not smoke and stay away from others who smoke. Nicotine and other chemicals in cigarettes and cigars can cause lung damage and make your cough worse. Ask your healthcare provider for information if you currently smoke and need help to quit. E-cigarettes or smokeless tobacco still contain nicotine. Talk to your healthcare provider before you use these products. Drink extra liquids as directed. Liquids will help thin and loosen mucus so you can cough it up. Liquids will also help prevent dehydration.  Examples of good liquids to drink include water, fruit juice, and broth. Do not drink liquids that contain caffeine. Caffeine can increase your risk for dehydration. Ask your healthcare provider how much liquid to drink each day. Rest as directed. Do not do activities that make your cough worse, such as exercise. Use a humidifier or vaporizer. Use a cool mist humidifier or a vaporizer to increase air moisture in your home. This may make it easier for you to breathe and help decrease your cough. Eat 2 to 5 mL of honey 2 times each day. Honey can help thin mucus and decrease your cough. Use cough drops or lozenges. These can help decrease throat irritation and your cough. Follow up with your healthcare provider as directed:  Write down your questions so you remember to ask them during your visits. © Copyright Olga Celestin 2023 Information is for End User's use only and may not be sold, redistributed or otherwise used for commercial purposes. The above information is an  only. It is not intended as medical advice for individual conditions or treatments. Talk to your doctor, nurse or pharmacist before following any medical regimen to see if it is safe and effective for you.

## 2023-10-25 NOTE — PROGRESS NOTES
[facility]        NAME: Shania Barth is a 57 y.o. female  : 1975    MRN: [id number]  DATE: 2023  TIME: 4:31 PM    Assessment and Plan   Acute cough [R05.1]  1. Acute cough  XR chest pa & lateral    predniSONE 20 mg tablet    benzonatate (TESSALON PERLES) 100 mg capsule        Lungs clear on PE and VSS. Preliminary x-ray read negative, final read pending. Will treat with oral steroids and Tessalon Perles as needed. Encouraged continued supportive measures. Follow up with PCP in 3-5 days or proceed to emergency department for worsening symptoms. Patient verbalized understanding of instructions given. Patient Instructions     Patient Instructions   Preliminary XR read negative, final read pending  Take oral steroids as prescribed  Continue with supportive measures, OTC Tylenol/Ibuprofen, nasal decongestants, and cough suppressants (Tessalon Perles)  Cool mist humidifiers, throat lozenges, increased fluid intake and rest   Follow up with PCP in 3-5 days  Present to ER if symptoms worsen   Acute Cough   AMBULATORY CARE:   An acute cough  can last up to 3 weeks. Common causes of an acute cough include a cold, allergies, or a lung infection. Seek care immediately if:   You have trouble breathing or feel short of breath. You cough up blood, or you see blood in your mucus. You faint or feel weak or dizzy. You have chest pain when you cough or take a deep breath. You have new wheezing. Contact your healthcare provider if:   You have a fever. Your cough lasts longer than 4 weeks. Your symptoms do not improve with treatment. You have questions or concerns about your condition or care. Treatment:  An acute cough usually goes away on its own. Ask your healthcare provider about medicines you can take to decrease your cough. You may need medicine to stop the cough, decrease swelling in your airways, or help open your airways.  Medicine may also be given to help you cough up mucus. If you have an infection caused by bacteria, you may need antibiotics. Manage your symptoms:   Do not smoke and stay away from others who smoke. Nicotine and other chemicals in cigarettes and cigars can cause lung damage and make your cough worse. Ask your healthcare provider for information if you currently smoke and need help to quit. E-cigarettes or smokeless tobacco still contain nicotine. Talk to your healthcare provider before you use these products. Drink extra liquids as directed. Liquids will help thin and loosen mucus so you can cough it up. Liquids will also help prevent dehydration. Examples of good liquids to drink include water, fruit juice, and broth. Do not drink liquids that contain caffeine. Caffeine can increase your risk for dehydration. Ask your healthcare provider how much liquid to drink each day. Rest as directed. Do not do activities that make your cough worse, such as exercise. Use a humidifier or vaporizer. Use a cool mist humidifier or a vaporizer to increase air moisture in your home. This may make it easier for you to breathe and help decrease your cough. Eat 2 to 5 mL of honey 2 times each day. Honey can help thin mucus and decrease your cough. Use cough drops or lozenges. These can help decrease throat irritation and your cough. Follow up with your healthcare provider as directed:  Write down your questions so you remember to ask them during your visits. © Copyright Tonya Ranks 2023 Information is for End User's use only and may not be sold, redistributed or otherwise used for commercial purposes. The above information is an  only. It is not intended as medical advice for individual conditions or treatments. Talk to your doctor, nurse or pharmacist before following any medical regimen to see if it is safe and effective for you.       Chief Complaint     Chief Complaint   Patient presents with    Cough     Cough x 1 week- wheezing when she lays down. Concern is that she has pneumonia -had a mi after haing pneumonia         History of Present Illness       70-year-old female with a past medical history significant for hypertension and pneumonia presents with complaints of dry cough, chest tightness, and wheezing but only when lying flat at night x5 days. She denies nasal congestion or postnasal drip. States cough is not productive. No fever or chills. States positive sick contact/exposure as daughter was recently ill with similar symptoms. No vomiting or diarrhea. She has not been taking any OTC medications for her symptoms. Former smoker. Review of Systems   Review of Systems   Constitutional:  Negative for chills and fever. HENT:  Negative for congestion, ear discharge, ear pain, rhinorrhea, sore throat, trouble swallowing and voice change. Eyes:  Negative for discharge. Respiratory:  Positive for cough, chest tightness and wheezing. Negative for shortness of breath. Cardiovascular:  Negative for chest pain. Gastrointestinal:  Negative for abdominal pain, diarrhea, nausea and vomiting. Musculoskeletal:  Negative for myalgias. Skin:  Negative for rash.          Current Medications       Current Outpatient Medications:     ARIPiprazole (ABILIFY) 15 mg tablet, 15 mg if needed, Disp: , Rfl:     ARIPiprazole (ABILIFY) 30 mg tablet, , Disp: , Rfl:     atorvastatin (LIPITOR) 40 mg tablet, Take 40 mg by mouth daily, Disp: , Rfl:     benzonatate (TESSALON PERLES) 100 mg capsule, Take 1 capsule (100 mg total) by mouth 3 (three) times a day as needed for cough for up to 7 days, Disp: 20 capsule, Rfl: 0    lisinopril (ZESTRIL) 5 mg tablet, Take 1 tablet (5 mg total) by mouth daily, Disp: 90 tablet, Rfl: 3    predniSONE 20 mg tablet, Take 2 tablets (40 mg total) by mouth daily for 5 days, Disp: 10 tablet, Rfl: 0    sertraline (ZOLOFT) 100 mg tablet, Take 100 mg by mouth daily, Disp: , Rfl:     Current Allergies Allergies as of 10/25/2023 - Reviewed 10/25/2023   Allergen Reaction Noted    Ambrosia artemisiifolia (ragweed) skin test Itching and Swelling 02/08/2023    Cat hair extract Other (See Comments) 06/10/2021    Dog epithelium allergy skin test Other (See Comments) 02/08/2023    Metronidazole Facial Swelling 01/22/2021    Molds & smuts Swelling 02/08/2023    Other Other (See Comments) and Itching 06/10/2021    Sulfa antibiotics Facial Swelling and Other (See Comments) 11/06/2016            The following portions of the patient's history were reviewed and updated as appropriate: allergies, current medications, past family history, past medical history, past social history, past surgical history and problem list.     Past Medical History:   Diagnosis Date    Depression     Hyperlipemia     Hypertension     Myocardial infarction [facility])     summer 2020    Pneumonia        Past Surgical History:   Procedure Laterality Date    LAPAROSCOPY FOR ECTOPIC PREGNANCY         Family History   Problem Relation Age of Onset    No Known Problems Mother     Hypertension Father     Diabetes Father          Medications have been verified. Objective   /80   Pulse (!) 106   Temp 98 °F (36.7 °C)   Resp 16   Ht 5' 6" (1.676 m)   Wt 122 kg (268 lb)   SpO2 96%   BMI 43.26 kg/m²   No LMP recorded. Patient is perimenopausal.       Physical Exam     Physical Exam  Vitals and nursing note reviewed. Constitutional:       General: She is not in acute distress. Appearance: She is not toxic-appearing. HENT:      Head: Normocephalic. Right Ear: Tympanic membrane, ear canal and external ear normal.      Left Ear: Tympanic membrane, ear canal and external ear normal.      Nose: Nose normal.      Mouth/Throat:      Mouth: Mucous membranes are moist.      Pharynx: Oropharynx is clear. Eyes:      Conjunctiva/sclera: Conjunctivae normal.   Cardiovascular:      Rate and Rhythm: Normal rate and regular rhythm.       Heart sounds: Normal heart sounds. Pulmonary:      Effort: Pulmonary effort is normal. No respiratory distress. Breath sounds: Normal breath sounds. No stridor. No wheezing, rhonchi or rales. Lymphadenopathy:      Cervical: No cervical adenopathy. Skin:     General: Skin is warm and dry. Neurological:      Mental Status: She is alert and oriented to person, place, and time. Gait: Gait is intact.    Psychiatric:         Mood and Affect: Mood normal.         Behavior: Behavior normal.

## 2023-10-26 ENCOUNTER — TELEPHONE (OUTPATIENT)
Dept: URGENT CARE | Facility: CLINIC | Age: 48
End: 2023-10-26

## 2023-10-26 DIAGNOSIS — J18.9 PNEUMONIA OF RIGHT MIDDLE LOBE DUE TO INFECTIOUS ORGANISM: Primary | ICD-10-CM

## 2023-10-26 RX ORDER — AZITHROMYCIN 250 MG/1
TABLET, FILM COATED ORAL
Qty: 6 TABLET | Refills: 0 | Status: SHIPPED | OUTPATIENT
Start: 2023-10-26 | End: 2023-10-30

## 2023-10-26 NOTE — TELEPHONE ENCOUNTER
Patient called the clinic and we discussed results of CXR. She is aware abx at pharmacy for pick-up.

## 2023-10-26 NOTE — TELEPHONE ENCOUNTER
Attempted to call patient regarding CXR final interpretation right middle lobe PNA. Left voicemail with call back number to discuss results. In the meantime, will send course of abx to patient's preferred pharmacy.

## 2023-11-01 ENCOUNTER — TELEPHONE (OUTPATIENT)
Dept: FAMILY MEDICINE CLINIC | Facility: CLINIC | Age: 48
End: 2023-11-01

## 2023-12-11 ENCOUNTER — RA CDI HCC (OUTPATIENT)
Dept: OTHER | Facility: HOSPITAL | Age: 48
End: 2023-12-11

## 2023-12-11 NOTE — PROGRESS NOTES
E66.01  [address] [facility] coding opportunities          Chart Reviewed number of suggestions sent to Provider: 1     Patients Insurance     Medicare Insurance: [employer]

## 2023-12-14 ENCOUNTER — OFFICE VISIT (OUTPATIENT)
Dept: FAMILY MEDICINE CLINIC | Facility: CLINIC | Age: 48
End: 2023-12-14
Payer: MEDICARE

## 2023-12-14 VITALS
HEIGHT: 66 IN | TEMPERATURE: 96.7 F | BODY MASS INDEX: 41.81 KG/M2 | WEIGHT: 260.14 LBS | SYSTOLIC BLOOD PRESSURE: 115 MMHG | HEART RATE: 85 BPM | DIASTOLIC BLOOD PRESSURE: 63 MMHG | OXYGEN SATURATION: 97 %

## 2023-12-14 DIAGNOSIS — I10 ESSENTIAL HYPERTENSION: ICD-10-CM

## 2023-12-14 DIAGNOSIS — I42.9 IDIOPATHIC CARDIOMYOPATHY (HCC): ICD-10-CM

## 2023-12-14 DIAGNOSIS — H60.391 ACUTE INFECTIVE OTITIS EXTERNA, RIGHT: ICD-10-CM

## 2023-12-14 DIAGNOSIS — E78.2 MIXED HYPERLIPIDEMIA: ICD-10-CM

## 2023-12-14 DIAGNOSIS — R53.83 FATIGUE DUE TO DEPRESSION: Primary | ICD-10-CM

## 2023-12-14 DIAGNOSIS — F32.1 MODERATE MAJOR DEPRESSION (HCC): ICD-10-CM

## 2023-12-14 DIAGNOSIS — F32.A FATIGUE DUE TO DEPRESSION: Primary | ICD-10-CM

## 2023-12-14 DIAGNOSIS — F33.1 MODERATE RECURRENT MAJOR DEPRESSION (HCC): ICD-10-CM

## 2023-12-14 DIAGNOSIS — F25.1 SCHIZOAFFECTIVE DISORDER, DEPRESSIVE TYPE (HCC): ICD-10-CM

## 2023-12-14 PROCEDURE — 99214 OFFICE O/P EST MOD 30 MIN: CPT | Performed by: PHYSICIAN ASSISTANT

## 2023-12-14 NOTE — PROGRESS NOTES
Depression Screening Follow-up Plan: Patient's depression screening was positive with a PHQ-2 score of 6. Their PHQ-9 score was 13. Patient assessed for underlying major depression. They have no active suicidal ideations. Brief counseling provided and recommend additional follow-up/re-evaluation next office visit. Sees Dr. Kathia Burton who is a psychiatrist who is manage both her depression and schizoaffective disorder. Patient has been altering medications on her own and I have discouraged her from doing this. She states that she slept through her last appointment with Dr. Kathia Burton and needs to reschedule. She has stopped her Lamictal on her own and is taking a half a dose of Abilify. I asked her to call Dr. Kathia Burton and let him know about her change in medications as he needs to be aware since he is the prescriber for these medications. Assessment/Plan:       1. Fatigue due to depression    2. Essential hypertension    3. Idiopathic cardiomyopathy ([address])    4. Mixed hyperlipidemia    5. BMI 40.0-44.9, adult ([address])    6. Schizoaffective disorder, depressive type ([address])    7. Moderate major depression ([address])    8. Moderate recurrent major depression ([address])    9. Acute infective otitis externa, right  -     neomycin-polymyxin-hydrocortisone (CORTISPORIN) otic solution; Administer 4 drops to the right ear every 6 (six) hours     This 73-year-old female sees me for primary care services. Patient is raising a 5year-old daughter and has a lot of insecurity and anxiety in this regard. She states that Chu is a very rough time for her as an event in childhood caused her to not leave her bedroom for 9 months which began over the Christmas holiday. Patient has a lot of angst with regard to her daughter and [employer]. She has never told her daughter that there is or is not a [employer] and she does not know how much to tell her daughter.   She did by her daughter some presents that would allow them to work together building a roller coaster and she is scared that her daughter will not like this present. Patient has missed some psychiatric appointments. She feels that Dr. Clifford Cruz her psychiatrist is primarily managing her case with medications alone. Patient has extreme fatigue which might be related to her depression. She typically gets up at 8 AM to see her daughter off for school. She then goes back to bed and awakens before her daughter comes home from school. She will cook dinner and help her daughter with her homework and then goes to bed at 8 PM allowing her to have 12 hours of sleep. Patient states that when she awakens she is just as tired as when she went to bed. She has no energy to do anything. Patient feels that her depression is what is causing nonrestorative sleep. She sleeps so much that she has not had enough awakening time to eat so she has lost 8 pounds. She would like to continue to use weight. Patient is complaining of pain in her right ear. She does not notice any discharge. No hearing loss vertigo or dizziness. Her daughter recently also had pain in her ear for which she was seen by healthcare provider. She does not recall getting any water in her ear. Patient's PHQ 9 score was 13 today. She is not having anxiety associated with her depression. Is taking sertraline for her depression which has helped her in the past.  She is stopped her Lamictal and is taking half the dose of Abilify that has been prescribed for her. Her schizoaffective disorder is being managed by her psychiatrist Dr. Clifford Cruz who she has been seeing for quite some time. Patient is adherent with the blood pressure medicine lisinopril and the antihyperlipidemic agent atorvastatin. With a diagnosis of acute otitis externa on the right side, patient is willing to use drops in her right ear and cover her ear with a cotton ball to keep the drops in place.     A total of 30 minutes was spent rendering care for this patient. This time included review of the patient's electronic medical record, performing the history and physical, reviewing appropriate labs and/or images, developing a treatment and assessment plan, answering patient's questions and concerns, and documenting the patient visit. I will see the patient in 2 months to assess her mental state and I would be happy to see her in the interim if necessary. Subjective:      Patient ID: Samantha Schaefer is a 57 y.o. female. HPI: 82-year-old female who is cooperative with the exam.  She has a nontoxic appearance. History is as above. She is self regulating her medications because she is overwhelmingly consumed with being excessively fatigued. We did thyroid function studies on her and they were normal.  She has no history of anemia or iron deficiency. She is not having dyspnea upon exertion. She is not having palpitations or tachycardia. She continues to have nonrestorative sleep which is a big issue for her. Patient is sleeping almost 16 hours to 18 hours/day. This definitely impacts her ability to function. She states that she gets quite lonely when her daughter is at school and does not know what to do with the time without her daughter. Is a lot of anxiety over the Chu holidays as she has had some bad experiences in the past.    The following portions of the patient's history were reviewed and updated as appropriate: allergies, current medications, past family history, past medical history, past social history, past surgical history, and problem list.    Review of Systems recent URI or viral syndrome. She is complaining of right ear pain.     Objective:      /63 (BP Location: Right arm, Patient Position: Sitting, Cuff Size: Large)   Pulse 85   Temp (!) 96.7 °F (35.9 °C) (Tympanic)   Ht 5' 6" (1.676 m)   Wt 118 kg (260 lb 2.3 oz)   SpO2 97%   BMI 41.99 kg/m²          Physical Exam well-developed well-nourished nontoxic-appearing 51-year-old female who is alert oriented and cooperative with the exam.  She did not appear fatigued during the examination today. Patient's left ear was normal with no pain on palpation or pressure on the tragus. Patient's right ear had increased pain with tragal pressure. She has some minor pain in the posterior auricular node on the right side. Patient's TM was otherwise clear. Throat was without hyperemia. No cervical adenopathy. Patient's Ames test did not lateralize. Giselle test showed AC greater than BC bilaterally. Patient's heart is regular rate without murmur rub or gallops. Lungs are clear to auscultation.

## 2024-02-05 ENCOUNTER — TELEPHONE (OUTPATIENT)
Dept: ADMINISTRATIVE | Facility: OTHER | Age: 49
End: 2024-02-05

## 2024-02-05 NOTE — TELEPHONE ENCOUNTER
----- Message from Carmen Nayak sent at 2/5/2024 10:49 AM EST -----  Regarding: Care Gap Request  CRC: Colonoscopy  02/05/24 10:49 AM    Hello, our patient attached above has had CRC: Colonoscopy completed/performed. Please assist in updating the patient chart by pulling the Care Everywhere (CE) document. The date of service is 2/8/2010.  PDF attached inside encounter     Thank you,  Carmen Nayak  [facility] PRIMARY [facility]

## 2024-02-06 NOTE — TELEPHONE ENCOUNTER
Upon review of the In Basket request we were able to locate, review, and update the patient chart as requested for CRC: Colonoscopy.    Any additional questions or concerns should be emailed to the Practice Liaisons via the appropriate education email address, please do not reply via In Basket.    Thank you  Kathy Garcia

## 2024-02-12 ENCOUNTER — RA CDI HCC (OUTPATIENT)
Dept: OTHER | Facility: HOSPITAL | Age: 49
End: 2024-02-12

## 2024-02-12 NOTE — PROGRESS NOTES
E66.01  HCC coding opportunities          Chart Reviewed number of suggestions sent to Provider: 1     Patients Insurance     Medicare Insurance: Medicare

## 2024-03-05 ENCOUNTER — OFFICE VISIT (OUTPATIENT)
Dept: FAMILY MEDICINE CLINIC | Facility: CLINIC | Age: 49
End: 2024-03-05
Payer: MEDICARE

## 2024-03-05 VITALS
OXYGEN SATURATION: 95 % | DIASTOLIC BLOOD PRESSURE: 84 MMHG | WEIGHT: 261.91 LBS | BODY MASS INDEX: 42.09 KG/M2 | HEART RATE: 101 BPM | TEMPERATURE: 98.2 F | HEIGHT: 66 IN | SYSTOLIC BLOOD PRESSURE: 122 MMHG

## 2024-03-05 DIAGNOSIS — M17.11 PRIMARY OSTEOARTHRITIS OF RIGHT KNEE: ICD-10-CM

## 2024-03-05 DIAGNOSIS — Z12.11 ENCOUNTER FOR COLORECTAL CANCER SCREENING: ICD-10-CM

## 2024-03-05 DIAGNOSIS — I10 ESSENTIAL HYPERTENSION: ICD-10-CM

## 2024-03-05 DIAGNOSIS — E78.2 MIXED HYPERLIPIDEMIA: ICD-10-CM

## 2024-03-05 DIAGNOSIS — R53.83 OTHER FATIGUE: ICD-10-CM

## 2024-03-05 DIAGNOSIS — M70.41 PREPATELLAR BURSITIS, RIGHT KNEE: ICD-10-CM

## 2024-03-05 DIAGNOSIS — I70.219 ATHEROSCLEROTIC PVD WITH INTERMITTENT CLAUDICATION (HCC): ICD-10-CM

## 2024-03-05 DIAGNOSIS — Z12.12 ENCOUNTER FOR COLORECTAL CANCER SCREENING: ICD-10-CM

## 2024-03-05 DIAGNOSIS — I42.9 IDIOPATHIC CARDIOMYOPATHY (HCC): ICD-10-CM

## 2024-03-05 DIAGNOSIS — F25.1 SCHIZOAFFECTIVE DISORDER, DEPRESSIVE TYPE (HCC): Primary | ICD-10-CM

## 2024-03-05 PROCEDURE — G2211 COMPLEX E/M VISIT ADD ON: HCPCS | Performed by: PHYSICIAN ASSISTANT

## 2024-03-05 PROCEDURE — 99213 OFFICE O/P EST LOW 20 MIN: CPT | Performed by: PHYSICIAN ASSISTANT

## 2024-03-05 RX ORDER — ATORVASTATIN CALCIUM 40 MG/1
40 TABLET, FILM COATED ORAL DAILY
Qty: 90 TABLET | Refills: 3 | Status: SHIPPED | OUTPATIENT
Start: 2024-03-05

## 2024-03-05 RX ORDER — ARIPIPRAZOLE 30 MG/1
30 TABLET ORAL DAILY
COMMUNITY

## 2024-03-05 NOTE — PROGRESS NOTES
Assessment/Plan:       1. Schizoaffective disorder, depressive type (HCC)    2. Atherosclerotic PVD with intermittent claudication (HCC)    3. Idiopathic cardiomyopathy (HCC)    4. BMI 40.0-44.9, adult (HCC)    5. Encounter for colorectal cancer screening  -     Cologalexandr    6. Essential hypertension    7. Other fatigue    8. Mixed hyperlipidemia  -     atorvastatin (LIPITOR) 40 mg tablet; Take 1 tablet (40 mg total) by mouth daily    9. Primary osteoarthritis of right knee  -     Ambulatory Referral to Orthopedic Surgery; Future    10. Prepatellar bursitis, right knee        This 48-year-old female sees me for primary care services.  She sees Dr. Pedraza for psychiatric services.  Patient has a history of schizoaffective disorder.  She is taking Abilify up to 30 mg nightly.  She is also on sertraline 10 mg daily.  Her primary complaint today is severe fatigue.  Patient will go to bed between 7 and 7:30 at night and sleep until 7 the next morning.  She gets her daughter ready to go on the bus.  After her daughter gets on the bus, the patient goes back to bed and sleeps until her daughter comes home from school.  This has been a recurrent pattern for her.  She has told Dr. Pedraza that the Abilify causes her to have overwhelming fatigue.  She is hoping that her next appointment with Dr. Pedraza that she can get an adjustment in this medication.    Patient states that she needs to take the Abilify in order to lessen the voices and cites that she sees when she is not on the Abilify.  Patient states that she does not have any initiative to leave her home.  She is not sure whether the sertraline is being effective to help with her depression.    Patient continues to take lisinopril for her blood pressure.  Blood pressure today is well-controlled.  Patient has a history of hyperlipidemia treated with atorvastatin.  Atorvastatin has decreased her risk for atherosclerosis to 0.6% ASCVD risk.  I have given her a new refill for  "the atorvastatin.    As far as prevention is concerned, patient did not want to do a bowel prep for colonoscopy.  She was unable to complete the bowel prep when she tried it in the past.  As part of shared decision making, patient is willing to send in Cologuard and this process was explained to the patient.  Patient does not have a family history of colorectal cancer but her father has a history of colonic polyps that were not malignant.    A total of 25 minutes was spent rendering care for this patient.  This time included review of the patient's electronic medical record, performing the history and physical, reviewing appropriate labs and/or images, developing a treatment and assessment plan, answering patient's questions and concerns, and documenting the patient visit.  I will see the patient in 1 month to do her Medicare annual wellness.  Subjective:      Patient ID: Brennen Cerna is a 48 y.o. female.    HPI: Patient remains very sedentary.  She denies pain in her chest or heart palpitations.  She denies easy bruising or bleeding.  She denies changes in her bowel or bladder habits.  She denies difficulty passing her water.    She continues to have overwhelming fatigue which she feels is directly related to the Abilify treatment.    The following portions of the patient's history were reviewed and updated as appropriate: allergies, current medications, past family history, past medical history, past social history, past surgical history, and problem list.    Review of Systems no recent URI or viral syndrome.    Objective:      /84 (BP Location: Right arm, Patient Position: Sitting, Cuff Size: Standard)   Pulse 101   Temp 98.2 °F (36.8 °C) (Oral)   Ht 5' 6\" (1.676 m)   Wt 119 kg (261 lb 14.5 oz)   SpO2 95%   BMI 42.27 kg/m²          Physical Exam well-developed nontoxic-appearing 48-year-old female who appropriately cooperates with the exam.    She is normotensive.  She is afebrile.    Patient's " heart is regular rate without murmur rub or gallops.  Lungs are clear to auscultation.

## 2024-03-13 ENCOUNTER — HOSPITAL ENCOUNTER (OUTPATIENT)
Dept: RADIOLOGY | Facility: CLINIC | Age: 49
Discharge: HOME | End: 2024-03-13
Payer: MEDICARE

## 2024-03-13 ENCOUNTER — OFFICE VISIT (OUTPATIENT)
Dept: OBGYN CLINIC | Facility: CLINIC | Age: 49
End: 2024-03-13
Payer: MEDICARE

## 2024-03-13 VITALS
BODY MASS INDEX: 42.91 KG/M2 | HEIGHT: 66 IN | DIASTOLIC BLOOD PRESSURE: 97 MMHG | TEMPERATURE: 98 F | SYSTOLIC BLOOD PRESSURE: 130 MMHG | HEART RATE: 95 BPM | WEIGHT: 267 LBS

## 2024-03-13 DIAGNOSIS — M25.561 CHRONIC PAIN OF RIGHT KNEE: ICD-10-CM

## 2024-03-13 DIAGNOSIS — E66.01 CLASS 3 SEVERE OBESITY WITH BODY MASS INDEX (BMI) OF 40.0 TO 44.9 IN ADULT, UNSPECIFIED OBESITY TYPE, UNSPECIFIED WHETHER SERIOUS COMORBIDITY PRESENT (HCC): Primary | ICD-10-CM

## 2024-03-13 DIAGNOSIS — I70.219 ATHEROSCLEROTIC PVD WITH INTERMITTENT CLAUDICATION (HCC): ICD-10-CM

## 2024-03-13 DIAGNOSIS — G89.29 CHRONIC PAIN OF RIGHT KNEE: ICD-10-CM

## 2024-03-13 DIAGNOSIS — F25.1 SCHIZOAFFECTIVE DISORDER, DEPRESSIVE TYPE (HCC): ICD-10-CM

## 2024-03-13 DIAGNOSIS — M17.11 PRIMARY OSTEOARTHRITIS OF RIGHT KNEE: ICD-10-CM

## 2024-03-13 PROBLEM — E66.813 CLASS 3 SEVERE OBESITY WITH BODY MASS INDEX (BMI) OF 40.0 TO 44.9 IN ADULT (HCC): Status: ACTIVE | Noted: 2024-03-13

## 2024-03-13 PROCEDURE — 73564 X-RAY EXAM KNEE 4 OR MORE: CPT

## 2024-03-13 PROCEDURE — 99204 OFFICE O/P NEW MOD 45 MIN: CPT | Performed by: ORTHOPAEDIC SURGERY

## 2024-03-13 NOTE — PATIENT INSTRUCTIONS
Will be placed for viscosupplement injection as the patient does not want any more cortisone injections has not had these in the past.  Continue attempts at weight loss.

## 2024-03-13 NOTE — PROGRESS NOTES
48 y.o.female presents for initial [facility] orthopedics evaluation of right knee pain.  She has been seen another orthopedic physician Dr. Lozano has been treating her for primary arthritis of the right knee and has been using cortisone injections.  She reports that her last injection/visit with him was over a year ago.  Does not want any more cortisone injections as she feels these are not helping.  She she was also told that she has some degree of pes anserine bursitis.  She notes that she has difficulty walking that if she walks any sustained distance she has pain for 2 days.  For instance, she states she walked a mile and three quarters recently, had no pain while walking but had 2 days of pain after walking.  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  She denies discrete locking or catching.  No x-rays in the [facility] system.  Reports that she has occasional pain in her left knee but her right knee is her definitely more symptomatic knee and she would like to evaluated just for the right knee today.  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    Review of Systems  Review of systems negative unless otherwise specified in HPI    Past Medical History  Past Medical History:   Diagnosis Date    Depression     Hyperlipemia     Hypertension     Myocardial infarction (HCC)     summer 2020    Pneumonia      Past Surgical History  Past Surgical History:   Procedure Laterality Date    LAPAROSCOPY FOR ECTOPIC PREGNANCY       Current Medications  Current Outpatient Medications on File Prior to Visit   Medication Sig Dispense Refill    ARIPiprazole (Abilify) 30 mg tablet Take 30 mg by mouth daily      atorvastatin (LIPITOR) 40 mg tablet Take 1 tablet (40 mg total) by mouth daily 90 tablet 3    lisinopril (ZESTRIL) 5 mg tablet Take 1 tablet (5 mg total) by mouth daily 90 tablet 3    sertraline (ZOLOFT) 100 mg tablet Take 100 mg by mouth daily       neomycin-polymyxin-hydrocortisone (CORTISPORIN) otic solution Administer 4 drops to the right ear every 6 (six) hours (Patient not taking: Reported on 3/13/2024) 10 mL 0     No current facility-administered medications on file prior to visit.     Recent Labs (HCT,HGB,PT,INR,ESR,CRP,GLU,HgA1C)  0   Lab Value Date/Time    HCT 37.5 01/22/2021 1642    HGB 12.1 01/22/2021 1642    WBC 7.85 01/22/2021 1642    PT 13.0 10/12/2021 1100    INR 1.0 10/12/2021 1100    HGBA1C 5.2 02/24/2023 0947     Physical exam  Body mass index is 43.09 kg/m².   General: Awake, Alert, Oriented, slightly sad and affect visible upset and explaining inability to walk well without pain.  Eyes: Pupils equal, round and reactive to light  Heart: regular rate and rhythm  Lungs: No audible wheezing  Abdomen: soft  right Knee exam  She is able get full extension with flexion to approximately 120 degrees.  She has some pain over the lateral patella facet and has a positive patellar hook test for reproducible pain.  She locates her primary pain over her medial knee.  There is minimal pain in the medial pretibial area/pes anserine area with some tenderness of the medial hamstring tendon.  She ambulates without difficulty and without evident limp.  There is no obvious effusion noted..    Imaging  I personally viewed x-rays of the right knee taken in the office today which document significant medial compartment narrowing with small spurring of the distal femur and proximal tibia.  She has a varus alignment.  There is no significant disease noted at the lateral compartment or patellofemoral compartment.    1. Class 3 severe obesity with body mass index (BMI) of 40.0 to 44.9 in adult, unspecified obesity type, unspecified whether serious comorbidity present (HCC)    2. Chronic pain of right knee    3. Primary osteoarthritis of right knee    4. Schizoaffective disorder, depressive type ([facility])    5. Atherosclerotic PVD with intermittent claudication ([facility])       Assessment:  right knee arthritis with BMI 43.09    Plan: Treatment options were discussed.  She is willing to give viscosupplement injection a trial and a request for authorization was placed in her chart.  I will see her once this has been approved.  She is to contact me if questions or concerns arise.  Weight loss would be beneficial.    This note was created with voice recognition software.

## 2024-03-22 LAB — COLOGUARD RESULT REPORTABLE: NEGATIVE

## 2024-06-13 ENCOUNTER — PROCEDURE VISIT (OUTPATIENT)
Dept: OBGYN CLINIC | Facility: CLINIC | Age: 49
End: 2024-06-13
Payer: MEDICARE

## 2024-06-13 VITALS
SYSTOLIC BLOOD PRESSURE: 130 MMHG | WEIGHT: 266 LBS | TEMPERATURE: 97.7 F | OXYGEN SATURATION: 98 % | BODY MASS INDEX: 42.75 KG/M2 | HEIGHT: 66 IN | HEART RATE: 71 BPM | DIASTOLIC BLOOD PRESSURE: 80 MMHG

## 2024-06-13 DIAGNOSIS — M17.11 PRIMARY OSTEOARTHRITIS OF RIGHT KNEE: ICD-10-CM

## 2024-06-13 DIAGNOSIS — M25.561 CHRONIC PAIN OF RIGHT KNEE: Primary | ICD-10-CM

## 2024-06-13 DIAGNOSIS — G89.29 CHRONIC PAIN OF RIGHT KNEE: Primary | ICD-10-CM

## 2024-06-13 PROCEDURE — 20610 DRAIN/INJ JOINT/BURSA W/O US: CPT | Performed by: ORTHOPAEDIC SURGERY

## 2024-06-13 NOTE — PROGRESS NOTES
ASSESSMENT/PLAN:    Diagnoses and all orders for this visit:    Chronic pain of right knee    Primary osteoarthritis of right knee      Plan: After permission was granted, the right knee was injected with Durolane as per requested at previous visit.  She tolerated this well.  She did state that the knee was sore after the injection and questioned whether this was typical.  She has been reassured that some postinjection pain is not uncommon.  However, she was instructed to contact me if questions or concerns were to arise prior to follow-up in 4 to 6 weeks.    Return for 4 to 6 weeks.      _____________________________________________________  CHIEF COMPLAINT:  Chief Complaint   Patient presents with    Right Knee - Follow-up    Follow-up         SUBJECTIVE:  Brennen Cerna is a 48 y.o. year old female who presents for viscosupplementation.  She states that she did not return until now, having previously been seen in March, as she was hoping to have the injection done and give maximum potential benefit during the summer months.  She continues to complain of right knee pain.  She wishes to proceed with injection as planned.     PAST MEDICAL HISTORY:  Past Medical History:   Diagnosis Date    Depression     Hyperlipemia     Hypertension     Myocardial infarction (HCC)     summer 2020    Pneumonia        PAST SURGICAL HISTORY:  Past Surgical History:   Procedure Laterality Date    LAPAROSCOPY FOR ECTOPIC PREGNANCY         FAMILY HISTORY:  Family History   Problem Relation Age of Onset    No Known Problems Mother     Hypertension Father     Diabetes Father        SOCIAL HISTORY:  Social History     Tobacco Use    Smoking status: Former     Current packs/day: 0.00     Types: Cigarettes     Quit date:      Years since quittin.4     Passive exposure: Past    Smokeless tobacco: Never   Vaping Use    Vaping status: Former   Substance Use Topics    Alcohol use: Not Currently    Drug use: Not Currently        MEDICATIONS:    Current Outpatient Medications:     ARIPiprazole (Abilify) 30 mg tablet, Take 30 mg by mouth daily, Disp: , Rfl:     atorvastatin (LIPITOR) 40 mg tablet, Take 1 tablet (40 mg total) by mouth daily, Disp: 90 tablet, Rfl: 3    lisinopril (ZESTRIL) 5 mg tablet, Take 1 tablet (5 mg total) by mouth daily, Disp: 90 tablet, Rfl: 3    sertraline (ZOLOFT) 100 mg tablet, Take 100 mg by mouth daily, Disp: , Rfl:     neomycin-polymyxin-hydrocortisone (CORTISPORIN) otic solution, Administer 4 drops to the right ear every 6 (six) hours (Patient not taking: Reported on 3/13/2024), Disp: 10 mL, Rfl: 0    ALLERGIES:  Allergies   Allergen Reactions    Ambrosia Artemisiifolia (Ragweed) Skin Test Itching and Swelling    Cat Hair Extract Other (See Comments)    Dog Epithelium (Canis Lupus Familiaris) Other (See Comments)    Metronidazole Facial Swelling     face swells    Molds & Smuts Swelling    Other Other (See Comments) and Itching    Sulfa Antibiotics Facial Swelling and Other (See Comments)       REVIEW OF SYSTEMS:  Pertinent items are noted in HPI.  A comprehensive review of systems was negative.      _____________________________________________________  PHYSICAL EXAMINATION:  General: alert, oriented times 3 and appears comfortable  HEENT:  Normocephalic, atraumatic  Cardiovascular:  Regular  Pulmonary: No wheezing or stridor  Skin: No lacerations or abrasions.  Skin warm and dry  Neurovascular: Motor and sensory exams are grossly intact, +2 PT pulse.     MUSCULOSKELETAL EXAMINATION:    Right knee exam demonstrates the skin to be warm and dry.  There is no gross deformity.  She has full extension and flexion beyond 90 degrees.  Ligaments are grossly stable.          PROCEDURES PERFORMED:  Large joint arthrocentesis: R knee  Universal Protocol:  Consent: Verbal consent obtained.  Consent given by: patient  Patient understanding: patient states understanding of the procedure being performed  Supporting  Documentation  Indications: pain   Procedure Details  Location: knee - R knee  Needle size: 22 G  Approach: anterolateral  Medications administered: 3 mL sodium hyaluronate 60 MG/3ML                Sy Pink

## 2024-06-14 ENCOUNTER — TELEPHONE (OUTPATIENT)
Age: 49
End: 2024-06-14

## 2024-06-14 NOTE — TELEPHONE ENCOUNTER
Caller: patient     Doctor: Apryl     Reason for call: patient had Euflexxa injection in r knee 6/13, patient has pain and swelling today,  not red or warm,  asking if this is normal? She is currently using ice for the pain.     Patient has a block on her phone,  asking for a cb 2x so she can go in and hit Allow Calls for the # and can answer the 2nd cb.    No triage nurse available today.    Call back#: [phone]

## 2024-07-26 ENCOUNTER — TELEPHONE (OUTPATIENT)
Dept: FAMILY MEDICINE CLINIC | Facility: CLINIC | Age: 49
End: 2024-07-26

## 2024-07-26 ENCOUNTER — TELEPHONE (OUTPATIENT)
Age: 49
End: 2024-07-26

## 2024-07-26 NOTE — TELEPHONE ENCOUNTER
Ro from [facility] transferred Brennen in regards to the message I sent her earlier today. Patient wanted to know why she needs a medicare wellness exam , I explained to the patient that a medicare wellness exam is mandatory since she has medicare insurance. Patient understood and scheduled a Medicare Wellness for 08/01/2024 @1:20pm with Marshal.

## 2024-07-26 NOTE — TELEPHONE ENCOUNTER
Received call from patient asking if provider would order a Cardiac Calcium Score test for her as cardiac disease runs in her family.    Please follow up with patient to advise.

## 2024-07-26 NOTE — TELEPHONE ENCOUNTER
"Attempted to reach Brennen to relate the message per Marshal \"Please schedule patient schedule an appointment as she needs to have her medicare wellness exam which was due in April. We can talk about the need for coronary artery calcium score at that time.\" Left a voicemail.   "

## 2024-07-26 NOTE — TELEPHONE ENCOUNTER
Please schedule patient schedule an appointment as she needs to have her medicare wellness exam which was due in April. We can talk about the need for coronary artery calcium score at that time.     Willie

## 2024-07-26 NOTE — TELEPHONE ENCOUNTER
Pt returned call looking for Iesha raya transferred the call to practice clinical was answered by Iesha took the patient call. Thanks

## 2024-08-01 ENCOUNTER — TELEPHONE (OUTPATIENT)
Dept: FAMILY MEDICINE CLINIC | Facility: CLINIC | Age: 49
End: 2024-08-01

## 2024-08-01 NOTE — TELEPHONE ENCOUNTER
Attempted to reach Brennen to reschedule her AWV since she missed her appointment today with Marshal. Patient didn't answer/ left a voicemail.

## 2024-09-25 ENCOUNTER — OFFICE VISIT (OUTPATIENT)
Dept: FAMILY MEDICINE CLINIC | Facility: CLINIC | Age: 49
End: 2024-09-25
Payer: MEDICARE

## 2024-09-25 VITALS
TEMPERATURE: 98.7 F | DIASTOLIC BLOOD PRESSURE: 83 MMHG | OXYGEN SATURATION: 98 % | WEIGHT: 266.54 LBS | HEART RATE: 80 BPM | SYSTOLIC BLOOD PRESSURE: 137 MMHG | BODY MASS INDEX: 43.02 KG/M2

## 2024-09-25 DIAGNOSIS — I10 ESSENTIAL HYPERTENSION: ICD-10-CM

## 2024-09-25 DIAGNOSIS — E66.01 CLASS 3 SEVERE OBESITY WITH SERIOUS COMORBIDITY AND BODY MASS INDEX (BMI) OF 40.0 TO 44.9 IN ADULT, UNSPECIFIED OBESITY TYPE (HCC): ICD-10-CM

## 2024-09-25 DIAGNOSIS — I42.9 IDIOPATHIC CARDIOMYOPATHY (HCC): ICD-10-CM

## 2024-09-25 DIAGNOSIS — Z00.00 MEDICARE ANNUAL WELLNESS VISIT, SUBSEQUENT: Primary | ICD-10-CM

## 2024-09-25 DIAGNOSIS — E66.813 CLASS 3 SEVERE OBESITY DUE TO EXCESS CALORIES WITH SERIOUS COMORBIDITY AND BODY MASS INDEX (BMI) OF 40.0 TO 44.9 IN ADULT (HCC): ICD-10-CM

## 2024-09-25 DIAGNOSIS — F25.1 SCHIZOAFFECTIVE DISORDER, DEPRESSIVE TYPE (HCC): ICD-10-CM

## 2024-09-25 DIAGNOSIS — E66.813 CLASS 3 SEVERE OBESITY WITH SERIOUS COMORBIDITY AND BODY MASS INDEX (BMI) OF 40.0 TO 44.9 IN ADULT, UNSPECIFIED OBESITY TYPE (HCC): ICD-10-CM

## 2024-09-25 DIAGNOSIS — E66.01 CLASS 3 SEVERE OBESITY DUE TO EXCESS CALORIES WITH SERIOUS COMORBIDITY AND BODY MASS INDEX (BMI) OF 40.0 TO 44.9 IN ADULT (HCC): ICD-10-CM

## 2024-09-25 DIAGNOSIS — E78.2 MIXED HYPERLIPIDEMIA: ICD-10-CM

## 2024-09-25 DIAGNOSIS — I70.219 ATHEROSCLEROTIC PVD WITH INTERMITTENT CLAUDICATION (HCC): ICD-10-CM

## 2024-09-25 DIAGNOSIS — Z12.31 BREAST CANCER SCREENING BY MAMMOGRAM: ICD-10-CM

## 2024-09-25 DIAGNOSIS — M17.11 PRIMARY OSTEOARTHRITIS OF RIGHT KNEE: ICD-10-CM

## 2024-09-25 PROCEDURE — G0439 PPPS, SUBSEQ VISIT: HCPCS | Performed by: PHYSICIAN ASSISTANT

## 2024-09-25 RX ORDER — BUPROPION HYDROCHLORIDE 100 MG/1
TABLET ORAL
Qty: 70 TABLET | Refills: 0 | Status: SHIPPED | OUTPATIENT
Start: 2024-09-25

## 2024-09-25 RX ORDER — LURASIDONE HYDROCHLORIDE 80 MG/1
80 TABLET, FILM COATED ORAL
COMMUNITY
Start: 2024-09-17

## 2024-09-25 RX ORDER — NALTREXONE HYDROCHLORIDE 50 MG/1
TABLET, FILM COATED ORAL
Qty: 21 TABLET | Refills: 0 | Status: SHIPPED | OUTPATIENT
Start: 2024-09-25

## 2024-09-25 NOTE — ASSESSMENT & PLAN NOTE
Patient is adherent with taking atorvastatin.  Her risk over 10 years has been brought down to 0.9%.  She also has hypertension which increases her cardiovascular risk but this is well-controlled with antihypertensive medications.    Patient admitted forgetting to get the mammogram.  She would like to have the mammogram performed and read for 5 and this has been ordered for her.

## 2024-09-25 NOTE — ASSESSMENT & PLAN NOTE
Patient expressed a desire to have medication assistance in attempting to lose weight.  Her BMI is at 43.  As part of shared decision making and looking at affordability, patient is opting to start the generic combination medication Contrave.  Patient has been informed that currently Medicare does not cover any weight loss medications.  I explained the mechanism of action for these medications and as part of shared decision making, patient is opting to take a combination bupropion and naltrexone.  I carefully went over the way to take these medications and provided her instructions.  I will see her in 1 month to assess her response to this therapy and to provide additional behavioral medicine weight loss strategies.  Orders:    naltrexone (REVIA) 50 mg tablet; Weeks one and two: 1/2 tab in AM; Week 3 and thereafter 1 tab in AM    buPROPion (WELLBUTRIN) 100 mg tablet; Week 1: 1 tab in a.m.; week 2: 1 tab in a.m. and 1 tab in PM; week 3: 2 tabs in a.m. and 1 tab in PM; week 4: 2 tabs in a.m. and 2 tabs in p.m.

## 2024-09-25 NOTE — ASSESSMENT & PLAN NOTE
Patient has a history of bilateral calf pain with exercise.  This has been stable and is not worsening.

## 2024-09-25 NOTE — PROGRESS NOTES
Ambulatory Visit  Name: Brennen Cerna      : 1975      MRN: [id number]  Encounter Provider: Kelli Araya PA-C  Encounter Date: 2024   Encounter department: [facility]    Assessment & Plan  Medicare annual wellness visit, subsequent  Patient is here for her Medicare annual wellness subsequent visit.  Patient is still enjoying spending time with her daughter.  She had a season pass to [facility] and she greatly enjoyed the trips to [facility].  We reviewed her answers for the Medicare wellness.  Patient continues to do very well.  She is returning to her work with [employer].  This is allowing her to get some extra income especially while her daughter is at school.  Patient does note that she is extremely tired and sometimes after her daughter gets on the bus, patient gets back to sleep.  We talked about how important it is for her to expend energy in order to get additional energy.       Idiopathic cardiomyopathy (HCC)  Patient has a history of cardiomyopathy that is presently stable.  She is not having any issues with chest pain shortness of breath syncope or near syncope.       Essential hypertension  Blood pressure today well-controlled at 137/83.  She continues to take the lisinopril at night.  She is not having any complications from the medication.       Atherosclerotic PVD with intermittent claudication (HCC)  Patient has a history of bilateral calf pain with exercise.  This has been stable and is not worsening.       Primary osteoarthritis of right knee  Patient had a gel injection performed due to medial compartment arthritis of the right knee.  She is not having excessive pain in that right knee.  There are things that she would like to do such as hiking on [address] but she does not feel her knee is stable enough to do this.  She is considering getting walking sticks to provide more stability so that she can walk with more stability.  She  states that she is considering getting another gel injection performed but this cannot be done until at least January 2025.       Schizoaffective disorder, depressive type (HCC)  Patient continues to see psychiatry.  She has been transformed from Zoloft to Latuda for this condition.  She feels that her mental state is stable.  She continues to enjoy all the time with her daughter.  Her PHQ 2 score was 0.         Class 3 severe obesity with serious comorbidity and body mass index (BMI) of 40.0 to 44.9 in adult, unspecified obesity type (HCC)  Patient expressed a desire to have medication assistance in attempting to lose weight.  Her BMI is at 43.  As part of shared decision making and looking at affordability, patient is opting to start the generic combination medication Contrave.  Patient has been informed that currently Medicare does not cover any weight loss medications.  I explained the mechanism of action for these medications and as part of shared decision making, patient is opting to take a combination bupropion and naltrexone.  I carefully went over the way to take these medications and provided her instructions.  I will see her in 1 month to assess her response to this therapy and to provide additional behavioral medicine weight loss strategies.  Orders:    naltrexone (REVIA) 50 mg tablet; Weeks one and two: 1/2 tab in AM; Week 3 and thereafter 1 tab in AM    buPROPion (WELLBUTRIN) 100 mg tablet; Week 1: 1 tab in a.m.; week 2: 1 tab in a.m. and 1 tab in PM; week 3: 2 tabs in a.m. and 1 tab in PM; week 4: 2 tabs in a.m. and 2 tabs in p.m.    Mixed hyperlipidemia  Patient is adherent with taking atorvastatin.  Her risk over 10 years has been brought down to 0.9%.  She also has hypertension which increases her cardiovascular risk but this is well-controlled with antihypertensive medications.    Patient admitted forgetting to get the mammogram.  She would like to have the mammogram performed and read for 5 and  this has been ordered for her.     Preventive health issues were discussed with patient, and age appropriate screening tests were ordered as noted in patient's After Visit Summary. Personalized health advice and appropriate referrals for health education or preventive services given if needed, as noted in patient's After Visit Summary.    History of Present Illness     HPI: Pleasant 49-year-old female who is cooperative with the exam.  She has a range of emotions today and appears to be happy and well-adjusted.  She was concerned about fatigue and I suggested that the patient expend more energy in order to obtain more energy.  Taking long naps after getting up in the morning will only have the patient have more inertia.    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  This will allow her to get additional activity throughout the day will also help her financially.    No changes in bowel or bladder habits.  Patient Care Team:  Kelli Araya PA-C as PCP - General (Physician Assistant)    Review of Systems: She has adjusted to her daughter starting school.  She is finding things to do to keep her from being bored when her daughter is not around.  She denies any recent URI or viral syndrome.  Medical History Reviewed by provider this encounter:       Annual Wellness Visit Questionnaire       Health Risk Assessment:   Patient rates overall health as good. Patient feels that their physical health rating is same. Patient is satisfied with their life. Eyesight was rated as same. Hearing was rated as same. Patient feels that their emotional and mental health rating is much better. Patients states they are never, rarely angry. Patient states they are often unusually tired/fatigued. Pain experienced in the last 7 days has been some. Patient's pain rating has been 2/10. Patient states that she has experienced no weight loss or gain in last 6 months.     Fall Risk Screening:   In the past year, patient has  experienced: no history of falling in past year      Urinary Incontinence Screening:   Patient has not leaked urine accidently in the last six months.     Home Safety:  Patient has trouble with stairs inside or outside of their home. Patient has working smoke alarms and has no working carbon monoxide detector. Home safety hazards include: none.     Nutrition:   Current diet is Regular.     Medications:   Patient is currently taking over-the-counter supplements. OTC medications include: see medication list. Patient is able to manage medications.     Activities of Daily Living (ADLs)/Instrumental Activities of Daily Living (IADLs):   Walk and transfer into and out of bed and chair?: Yes  Dress and groom yourself?: Yes    Bathe or shower yourself?: Yes    Feed yourself? Yes  Do your laundry/housekeeping?: Yes  Manage your money, pay your bills and track your expenses?: Yes  Make your own meals?: Yes    Do your own shopping?: Yes    Previous Hospitalizations:   Any hospitalizations or ED visits within the last 12 months?: No      Advance Care Planning:   Living will: No    Durable POA for healthcare: No    Advanced directive: No      PREVENTIVE SCREENINGS      Cardiovascular Screening:    General: Screening Not Indicated and History Lipid Disorder      Colorectal Cancer Screening:     General: Screening Current      Breast Cancer Screening:     General: Screening Current      Cervical Cancer Screening:    General: Screening Current      Lung Cancer Screening:     General: Screening Not Indicated      Hepatitis C Screening:    General: Screening Current    Screening, Brief Intervention, and Referral to Treatment (SBIRT)    Screening  Typical number of drinks in a day: 0    Single Item Drug Screening:  How often have you used an illegal drug (including marijuana) or a prescription medication for non-medical reasons in the past year? never    Single Item Drug Screen Score: 0  Interpretation: Negative screen for possible  drug use disorder    Social Determinants of Health     Financial Resource Strain: Low Risk  (3/16/2023)    Overall Financial Resource Strain (CARDIA)     Difficulty of Paying Living Expenses: Not very hard   Food Insecurity: No Food Insecurity (1/28/2021)    Received from [employer]    Hunger Vital Sign     Worried About Running Out of Food in the Last Year: Never true     Ran Out of Food in the Last Year: Never true   Transportation Needs: No Transportation Needs (3/16/2023)    PRAPARE - Transportation     Lack of Transportation (Medical): No     Lack of Transportation (Non-Medical): No    Received from [employer]     No results found.    Objective     There were no vitals taken for this visit.    Physical Exam: Well-developed well-nourished 49-year-old female who is alert oriented and cooperative with the exam.  Reviewed vital signs.  She is normotensive and afebrile.    Heart is regular rate without murmur rub or gallops.  Lungs are clear to auscultation.

## 2024-09-25 NOTE — ASSESSMENT & PLAN NOTE
Patient has a history of cardiomyopathy that is presently stable.  She is not having any issues with chest pain shortness of breath syncope or near syncope.

## 2024-09-25 NOTE — ASSESSMENT & PLAN NOTE
Patient had a gel injection performed due to medial compartment arthritis of the right knee.  She is not having excessive pain in that right knee.  There are things that she would like to do such as hiking on [address] [employer] but she does not feel her knee is stable enough to do this.  She is considering getting walking sticks to provide more stability so that she can walk with more stability.  She states that she is considering getting another gel injection performed but this cannot be done until at least January 2025.

## 2024-09-25 NOTE — ASSESSMENT & PLAN NOTE
Blood pressure today well-controlled at 137/83.  She continues to take the lisinopril at night.  She is not having any complications from the medication.        alert

## 2024-09-25 NOTE — ASSESSMENT & PLAN NOTE
Patient continues to see psychiatry.  She has been transformed from Zoloft to Latuda for this condition.  She feels that her mental state is stable.  She continues to enjoy all the time with her daughter.  Her PHQ 2 score was 0.

## 2025-01-29 ENCOUNTER — OFFICE VISIT (OUTPATIENT)
Dept: OBGYN CLINIC | Facility: CLINIC | Age: 50
End: 2025-01-29
Payer: MEDICARE

## 2025-01-29 VITALS — BODY MASS INDEX: 43.52 KG/M2 | HEIGHT: 66 IN | WEIGHT: 270.8 LBS

## 2025-01-29 DIAGNOSIS — G89.29 CHRONIC PAIN OF RIGHT KNEE: ICD-10-CM

## 2025-01-29 DIAGNOSIS — E66.813 CLASS 3 SEVERE OBESITY DUE TO EXCESS CALORIES WITHOUT SERIOUS COMORBIDITY WITH BODY MASS INDEX (BMI) OF 40.0 TO 44.9 IN ADULT (HCC): ICD-10-CM

## 2025-01-29 DIAGNOSIS — E66.01 CLASS 3 SEVERE OBESITY DUE TO EXCESS CALORIES WITHOUT SERIOUS COMORBIDITY WITH BODY MASS INDEX (BMI) OF 40.0 TO 44.9 IN ADULT (HCC): ICD-10-CM

## 2025-01-29 DIAGNOSIS — M17.11 PRIMARY OSTEOARTHRITIS OF RIGHT KNEE: Primary | ICD-10-CM

## 2025-01-29 DIAGNOSIS — M25.561 CHRONIC PAIN OF RIGHT KNEE: ICD-10-CM

## 2025-01-29 PROCEDURE — 99213 OFFICE O/P EST LOW 20 MIN: CPT | Performed by: ORTHOPAEDIC SURGERY

## 2025-01-29 RX ORDER — LURASIDONE HYDROCHLORIDE 40 MG/1
40 TABLET, FILM COATED ORAL
COMMUNITY
Start: 2025-01-24

## 2025-01-29 RX ORDER — ARIPIPRAZOLE 15 MG/1
15 TABLET, ORALLY DISINTEGRATING ORAL DAILY
COMMUNITY
Start: 2025-01-24

## 2025-01-29 NOTE — ASSESSMENT & PLAN NOTE
Continue current weight loss program with primary care physician.  Consider formal referral to weight loss program if inadequate response to the primary care physician's program.

## 2025-01-29 NOTE — PROGRESS NOTES
Name: Brennen Cerna      : 1975      MRN: [id number]  Encounter Provider: Sy Pink DO  Encounter Date: 2025   Encounter department: [facility] [place]  :  Assessment & Plan  Primary osteoarthritis of right knee  Alfredo was reassured that participating in a exercise program and weight loss program would be beneficial and I would encourage her to continue.  At this time, she did not believe her symptoms warranted repeat injection.  However, she does understand that this can be considered if her symptoms worsen and she chooses to proceed.  I discussed referral to a formal weight loss clinic but she is currently working with her primary care physician and would prefer to continue doing so.  I have encouraged her to contact me if any questions or concerns arise.       Chronic pain of right knee         Class 3 severe obesity due to excess calories without serious comorbidity with body mass index (BMI) of 40.0 to 44.9 in adult (HCC)  Continue current weight loss program with primary care physician.  Consider formal referral to weight loss program if inadequate response to the primary care physician's program.           History of Present Illness   HPI  Brennen Cerna is a 49 y.o. female who presents to discuss her right knee.  She underwent viscosupplement injection in 2024 and continues to do well.  She has begun to pursue a more aggressive weight loss program with her primary care physician managing this program.  This has entailed working out in gym and she has noted that this seems to have aggravated her right knee symptoms.  She occasionally experiences some pain during workouts but generally experiences pain later that evening and the following morning.  She does note some mild start up pain and stiffness.  She states her pain is nowhere near as severe as it was back in  and has no interest in pursuing injections at this time but wanted to discuss whether or  "not continuing the exercise program was appropriate.      Review of Systems: The patient's 10 organ system review is negative excluding the chief complaint.       Objective   Ht 5' 6\" (1.676 m)   Wt 123 kg (270 lb 12.8 oz)   BMI 43.71 kg/m²      Physical Exam  The right knee exam demonstrated full extension and flexion to 120 degrees without significant pain.  She denies tenderness laterally and anteriorly.  She does have some tenderness over the medial joint line and tibial plateau.  The femoral condyle was slightly tender, but to a lesser extent.  Collateral and cruciate ligaments are grossly stable and no complaints are elicited with varus and valgus stress.  Crucial ligaments are stable.  There is no obvious effusion.  The skin is warm and dry.  "

## 2025-01-29 NOTE — ASSESSMENT & PLAN NOTE
Alfredo was reassured that participating in a exercise program and weight loss program would be beneficial and I would encourage her to continue.  At this time, she did not believe her symptoms warranted repeat injection.  However, she does understand that this can be considered if her symptoms worsen and she chooses to proceed.  I discussed referral to a formal weight loss clinic but she is currently working with her primary care physician and would prefer to continue doing so.  I have encouraged her to contact me if any questions or concerns arise.

## 2025-02-03 ENCOUNTER — OFFICE VISIT (OUTPATIENT)
Dept: FAMILY MEDICINE CLINIC | Facility: CLINIC | Age: 50
End: 2025-02-03
Payer: MEDICARE

## 2025-02-03 VITALS
WEIGHT: 267.64 LBS | SYSTOLIC BLOOD PRESSURE: 133 MMHG | TEMPERATURE: 97.5 F | HEART RATE: 97 BPM | DIASTOLIC BLOOD PRESSURE: 81 MMHG | HEIGHT: 66 IN | OXYGEN SATURATION: 98 % | BODY MASS INDEX: 43.01 KG/M2

## 2025-02-03 DIAGNOSIS — I42.9 IDIOPATHIC CARDIOMYOPATHY (HCC): ICD-10-CM

## 2025-02-03 DIAGNOSIS — E66.01 CLASS 3 SEVERE OBESITY WITH SERIOUS COMORBIDITY AND BODY MASS INDEX (BMI) OF 40.0 TO 44.9 IN ADULT, UNSPECIFIED OBESITY TYPE (HCC): ICD-10-CM

## 2025-02-03 DIAGNOSIS — E66.813 CLASS 3 SEVERE OBESITY WITH SERIOUS COMORBIDITY AND BODY MASS INDEX (BMI) OF 40.0 TO 44.9 IN ADULT, UNSPECIFIED OBESITY TYPE (HCC): ICD-10-CM

## 2025-02-03 DIAGNOSIS — Z12.31 ENCOUNTER FOR SCREENING MAMMOGRAM FOR BREAST CANCER: ICD-10-CM

## 2025-02-03 DIAGNOSIS — J06.9 ACUTE URI: ICD-10-CM

## 2025-02-03 DIAGNOSIS — F25.1 SCHIZOAFFECTIVE DISORDER, DEPRESSIVE TYPE (HCC): ICD-10-CM

## 2025-02-03 DIAGNOSIS — I70.219 ATHEROSCLEROTIC PVD WITH INTERMITTENT CLAUDICATION (HCC): ICD-10-CM

## 2025-02-03 DIAGNOSIS — I10 ESSENTIAL HYPERTENSION: ICD-10-CM

## 2025-02-03 PROCEDURE — G2211 COMPLEX E/M VISIT ADD ON: HCPCS | Performed by: PHYSICIAN ASSISTANT

## 2025-02-03 PROCEDURE — 99213 OFFICE O/P EST LOW 20 MIN: CPT | Performed by: PHYSICIAN ASSISTANT

## 2025-02-03 RX ORDER — NALTREXONE HYDROCHLORIDE 50 MG/1
TABLET, FILM COATED ORAL
Qty: 90 TABLET | Refills: 0 | Status: SHIPPED | OUTPATIENT
Start: 2025-02-03

## 2025-02-03 RX ORDER — BUPROPION HYDROCHLORIDE 100 MG/1
TABLET ORAL
Qty: 360 TABLET | Refills: 0 | Status: SHIPPED | OUTPATIENT
Start: 2025-02-03

## 2025-02-03 NOTE — ASSESSMENT & PLAN NOTE
Previously diagnosed with idiopathic cardiomyopathy.  Is on atorvastatin to lower her cardiovascular risk and is on lisinopril for blood pressure control.

## 2025-02-03 NOTE — PROGRESS NOTES
Name: Brennen Cerna      : 1975      MRN: [id number]  Encounter Provider: Kelli Araya PA-C  Encounter Date: 2/3/2025   Encounter department: [facility] PR[facility] C[facility]  :  Assessment & Plan  BMI 40.0-44.9, adult (HCC)  Patient has lost a total of 3 pounds in the last month.  She had been started on the generic Contrave in 2024.  She states that she took it for a month and that she felt that she was less hungry.  I gave her 1 month of a prescription and the patient was to be seen in the office but unfortunately, she did not come for the appointments.  She has been been without medications since that time.  She states that now she is interested in losing weight and wants to be restarted on the medications.  She is also been going to the gym approximately 3 days/week but hopes to go up to 5 days/week.       Class 3 severe obesity with serious comorbidity and body mass index (BMI) of 40.0 to 44.9 in adult, unspecified obesity type (HCC)  Restarted the patient on the generic Contrave and gave her 3 months of therapy and will need to see her at the end of 3 months for medication renewal and assessment of her weight loss.    Orders:  •  buPROPion (WELLBUTRIN) 100 mg tablet; 2 tabs in a.m. and 2 tabs in p.m.  •  naltrexone (REVIA) 50 mg tablet; 1 tab in AM    Schizoaffective disorder, depressive type (HCC)  Patient continues to see psychiatry for her schizoaffective disorder with treatment with Abilify and Latuda.  Her daughter accompanied her in the office and they seem to have a very good relationship.         Atherosclerotic PVD with intermittent claudication (HCC)  Patient is now walking on the treadmill using an elliptical and using a bicycle without having any claudication symptoms at this time.  She is spending 16 minutes on the elliptical 40 on the bike and 10 to 15 minutes on the treadmill.  This is going every 3 days/week but she is hoping to increase this to 5  "days/week while her daughter is in school.  Claudication symptoms will be helped with this exercise and she should exercise to the point where she is feeling the claudication symptoms.       Idiopathic cardiomyopathy (HCC)  Previously diagnosed with idiopathic cardiomyopathy.  Is on atorvastatin to lower her cardiovascular risk and is on lisinopril for blood pressure control.       Acute URI  Patient began having cold symptoms 5 days ago.  Has a runny nose slight congestion but no cough fever or pharyngitis.       Encounter for screening mammogram for breast cancer  Patient overdue on mammogram.  She wishes to have a mammogram [facility] an order was placed.  Orders:  •  Mammo screening bilateral w 3d and cad; Future    Essential hypertension  Patient's blood pressure under adequate control with the use of lisinopril at night.  She states she is adherent to taking this medication.              History of Present Illness   HPI: 49-year-old female sees me for her primary care services.  She is very motivated to lose weight at this time and the gist of this visit was for her to have weight loss.  She has lost 3 pounds in total.  She is now going to the gym and will be restarting her medications.  We talked about how she needs to slowly increase the intake of her generic Contrave since she has not been on this since October.    She is going to take medications and go to the gym for the next 3 months and will come back in for renewal of medication and assessment of her weight loss efforts.    She is not having pain in her chest dizziness lightheadedness syncope or near syncope.  Review of Systems: Battling an upper respiratory tract infection.  Daughter had had a cold recently and she believes that she also caught her cold.    Objective   /81 (BP Location: Left arm, Patient Position: Standing, Cuff Size: Large)   Pulse 97   Temp 97.5 °F (36.4 °C) (Tympanic)   Ht 5' 6\" (1.676 m)   Wt 121 kg (267 lb " 10.2 oz)   SpO2 98%   BMI 43.20 kg/m²      Physical Exam: 49-year-old female nontoxic appearance.  She is normotensive and afebrile.    Heart is regular rate without murmur rub or gallops.  Lungs are clear to auscultation.  Not having any egophony.  No use of accessory muscles of respiration.  Administrative Statements   I have spent a total time of 20 minutes in caring for this patient on the day of the visit/encounter including Risks and benefits of tx options, Instructions for management, Patient and family education, Importance of tx compliance, Impressions, Documenting in the medical record, Reviewing / ordering tests, medicine, procedures  , and Obtaining or reviewing history  .    I will see her in 3 months to assess her response to the medication for weight loss and her continuing at the gym.  She is going to try to increase this to 5 days/week while her daughter is in school.  This is a highly motivated patient who used to go back and sleep after she got her daughter on the bus and now she is going to go to the gym.  I congratulated her on these efforts.

## 2025-02-03 NOTE — ASSESSMENT & PLAN NOTE
Patient's blood pressure under adequate control with the use of lisinopril at night.  She states she is adherent to taking this medication.

## 2025-02-03 NOTE — ASSESSMENT & PLAN NOTE
Patient continues to see psychiatry for her schizoaffective disorder with treatment with Abilify and Latuda.  Her daughter accompanied her in the office and they seem to have a very good relationship.

## 2025-02-03 NOTE — ASSESSMENT & PLAN NOTE
Patient is now walking on the treadmill using an elliptical and using a bicycle without having any claudication symptoms at this time.  She is spending 16 minutes on the elliptical 40 on the bike and 10 to 15 minutes on the treadmill.  This is going every 3 days/week but she is hoping to increase this to 5 days/week while her daughter is in school.  Claudication symptoms will be helped with this exercise and she should exercise to the point where she is feeling the claudication symptoms.

## 2025-02-03 NOTE — ASSESSMENT & PLAN NOTE
Patient has lost a total of 3 pounds in the last month.  She had been started on the generic Contrave in September 2024.  She states that she took it for a month and that she felt that she was less hungry.  I gave her 1 month of a prescription and the patient was to be seen in the office but unfortunately, she did not come for the appointments.  She has been been without medications since that time.  She states that now she is interested in losing weight and wants to be restarted on the medications.  She is also been going to the gym approximately 3 days/week but hopes to go up to 5 days/week.

## 2025-02-03 NOTE — ASSESSMENT & PLAN NOTE
Restarted the patient on the generic Contrave and gave her 3 months of therapy and will need to see her at the end of 3 months for medication renewal and assessment of her weight loss.    Orders:  •  buPROPion (WELLBUTRIN) 100 mg tablet; 2 tabs in a.m. and 2 tabs in p.m.  •  naltrexone (REVIA) 50 mg tablet; 1 tab in AM

## 2025-02-19 ENCOUNTER — TELEPHONE (OUTPATIENT)
Age: 50
End: 2025-02-19

## 2025-02-19 NOTE — TELEPHONE ENCOUNTER
Received call from patient regarding  medication instructions for   buPROPion (WELLBUTRIN) 100 mg tablet    Reviewed instructions  verbalized understating   No additional questions at this time.

## 2025-03-03 DIAGNOSIS — E66.01 CLASS 3 SEVERE OBESITY WITH SERIOUS COMORBIDITY AND BODY MASS INDEX (BMI) OF 40.0 TO 44.9 IN ADULT, UNSPECIFIED OBESITY TYPE (HCC): ICD-10-CM

## 2025-03-03 DIAGNOSIS — E66.813 CLASS 3 SEVERE OBESITY WITH SERIOUS COMORBIDITY AND BODY MASS INDEX (BMI) OF 40.0 TO 44.9 IN ADULT, UNSPECIFIED OBESITY TYPE (HCC): ICD-10-CM

## 2025-03-04 RX ORDER — NALTREXONE HYDROCHLORIDE 50 MG/1
TABLET, FILM COATED ORAL
Qty: 90 TABLET | Refills: 0 | Status: SHIPPED | OUTPATIENT
Start: 2025-03-04

## 2025-03-11 ENCOUNTER — HOSPITAL ENCOUNTER (OUTPATIENT)
Dept: RADIOLOGY | Facility: CLINIC | Age: 50
Discharge: HOME | End: 2025-03-11
Payer: MEDICARE

## 2025-03-11 VITALS — WEIGHT: 260 LBS | HEIGHT: 66 IN | BODY MASS INDEX: 41.78 KG/M2

## 2025-03-11 DIAGNOSIS — Z12.31 ENCOUNTER FOR SCREENING MAMMOGRAM FOR BREAST CANCER: ICD-10-CM

## 2025-03-11 PROCEDURE — 77067 SCR MAMMO BI INCL CAD: CPT

## 2025-03-11 PROCEDURE — 77063 BREAST TOMOSYNTHESIS BI: CPT

## 2025-03-17 ENCOUNTER — APPOINTMENT (OUTPATIENT)
Dept: LAB | Facility: CLINIC | Age: 50
End: 2025-03-17
Payer: MEDICARE

## 2025-03-17 ENCOUNTER — OFFICE VISIT (OUTPATIENT)
Dept: FAMILY MEDICINE CLINIC | Facility: CLINIC | Age: 50
End: 2025-03-17
Payer: MEDICARE

## 2025-03-17 VITALS
BODY MASS INDEX: 43.3 KG/M2 | DIASTOLIC BLOOD PRESSURE: 70 MMHG | WEIGHT: 268.3 LBS | HEART RATE: 78 BPM | OXYGEN SATURATION: 98 % | TEMPERATURE: 97.9 F | SYSTOLIC BLOOD PRESSURE: 134 MMHG

## 2025-03-17 DIAGNOSIS — Z79.899 ENCOUNTER FOR LONG-TERM (CURRENT) USE OF MEDICATIONS: ICD-10-CM

## 2025-03-17 DIAGNOSIS — E66.01 CLASS 3 SEVERE OBESITY DUE TO EXCESS CALORIES WITHOUT SERIOUS COMORBIDITY WITH BODY MASS INDEX (BMI) OF 40.0 TO 44.9 IN ADULT (HCC): Primary | ICD-10-CM

## 2025-03-17 DIAGNOSIS — E66.813 CLASS 3 SEVERE OBESITY DUE TO EXCESS CALORIES WITHOUT SERIOUS COMORBIDITY WITH BODY MASS INDEX (BMI) OF 40.0 TO 44.9 IN ADULT (HCC): Primary | ICD-10-CM

## 2025-03-17 LAB
ALBUMIN SERPL BCG-MCNC: 3.9 G/DL (ref 3.5–5)
ALP SERPL-CCNC: 62 U/L (ref 34–104)
ALT SERPL W P-5'-P-CCNC: 14 U/L (ref 7–52)
ANION GAP SERPL CALCULATED.3IONS-SCNC: 6 MMOL/L (ref 4–13)
AST SERPL W P-5'-P-CCNC: 16 U/L (ref 13–39)
BILIRUB SERPL-MCNC: 0.74 MG/DL (ref 0.2–1)
BUN SERPL-MCNC: 13 MG/DL (ref 5–25)
CALCIUM SERPL-MCNC: 9.1 MG/DL (ref 8.4–10.2)
CHLORIDE SERPL-SCNC: 106 MMOL/L (ref 96–108)
CHOLEST SERPL-MCNC: 119 MG/DL (ref ?–200)
CO2 SERPL-SCNC: 30 MMOL/L (ref 21–32)
CREAT SERPL-MCNC: 0.81 MG/DL (ref 0.6–1.3)
ERYTHROCYTE [DISTWIDTH] IN BLOOD BY AUTOMATED COUNT: 13.4 % (ref 11.6–15.1)
GFR SERPL CREATININE-BSD FRML MDRD: 86 ML/MIN/1.73SQ M
GLUCOSE P FAST SERPL-MCNC: 97 MG/DL (ref 65–99)
HCT VFR BLD AUTO: 40.1 % (ref 34.8–46.1)
HDLC SERPL-MCNC: 49 MG/DL
HGB BLD-MCNC: 12.6 G/DL (ref 11.5–15.4)
LDLC SERPL CALC-MCNC: 60 MG/DL (ref 0–100)
MCH RBC QN AUTO: 29.6 PG (ref 26.8–34.3)
MCHC RBC AUTO-ENTMCNC: 31.4 G/DL (ref 31.4–37.4)
MCV RBC AUTO: 94 FL (ref 82–98)
NONHDLC SERPL-MCNC: 70 MG/DL
PLATELET # BLD AUTO: 198 THOUSANDS/UL (ref 149–390)
PMV BLD AUTO: 12.3 FL (ref 8.9–12.7)
POTASSIUM SERPL-SCNC: 4 MMOL/L (ref 3.5–5.3)
PROT SERPL-MCNC: 6.4 G/DL (ref 6.4–8.4)
RBC # BLD AUTO: 4.26 MILLION/UL (ref 3.81–5.12)
SODIUM SERPL-SCNC: 142 MMOL/L (ref 135–147)
TRIGL SERPL-MCNC: 50 MG/DL (ref ?–150)
TSH SERPL DL<=0.05 MIU/L-ACNC: 2.38 UIU/ML (ref 0.45–4.5)
WBC # BLD AUTO: 4.51 THOUSAND/UL (ref 4.31–10.16)

## 2025-03-17 PROCEDURE — G2211 COMPLEX E/M VISIT ADD ON: HCPCS | Performed by: PHYSICIAN ASSISTANT

## 2025-03-17 PROCEDURE — 99213 OFFICE O/P EST LOW 20 MIN: CPT | Performed by: PHYSICIAN ASSISTANT

## 2025-03-17 PROCEDURE — 80053 COMPREHEN METABOLIC PANEL: CPT

## 2025-03-17 PROCEDURE — 36415 COLL VENOUS BLD VENIPUNCTURE: CPT

## 2025-03-17 PROCEDURE — 84443 ASSAY THYROID STIM HORMONE: CPT

## 2025-03-17 PROCEDURE — 80061 LIPID PANEL: CPT

## 2025-03-17 PROCEDURE — 85027 COMPLETE CBC AUTOMATED: CPT

## 2025-03-17 NOTE — PROGRESS NOTES
Name: Brennen Cerna      : 1975      MRN: [id number]  Encounter Provider: Kelli Araya PA-C  Encounter Date: 3/17/2025   Encounter department: [facility] PRIMARY C[facility]  :  Assessment & Plan  Class 3 severe obesity due to excess calories without serious comorbidity with body mass index (BMI) of 40.0 to 44.9 in adult (HCC)  Patient continuing Contrave medication generic equivalent due to her ongoing obesity.  Tolerating diet and activities for continued weight loss attempt.         BMI 40.0-44.9, adult (HCC)  Patient has been full dose generic Contrave medication for 1 month.  She states that she often gets 8 evening dosing of bupropion.  She states that she has not yet seen actual weight loss despite her tracking her food intake and her activity.  She has been very active at the gym 3 to 5 days/week standing up to 3 hours and remained 6172-9275 calories per trip to the gym.              History of Present Illness   HPI: 49-year-old female who states that since beginning the generic Contrave medication for weight loss that she has had a dramatic improvement in her mental state.  In the past, she was sleeping 16 to 20 hours/day.  She would get her daughter to school and then sleep until her daughter came home from school in addition to sleeping poorly overnight.  She is now having energy and is not sleeping during the day.    She goes to [employer] 3 to 5 days/week.  She spends 2-1/2-3 and half hours per session.  She uses the elliptical for 30 minutes the bike for 45 to 50 minutes walking 15 to 20 minutes and does lifting and abs for another hour and a half.  She tracks her calories on the machines and sees that she is exercising up to 7000 la per session.  Despite this, she has not seen any dramatic weight loss.  She states that she is tracking her intake of calories and that she is consistently under 2000 la and ranges from 1400 la to 2000 la.    She is quite  frustrated over not losing weight.  I let her know that this medication causes very slow weight loss and that she should not be giving up on it especially if she is not getting the second dose in the evening.  The bottom line is that she feels more energized she is not sleeping during the day and is actively trying to get more activities in during the day.  All of these positive changes will play out in the long run.    She has occasional feelings of skipped beats when sitting but she is able to exercise to a large extent without having any symptoms.  Will we went over her exercise plan, I suggested that she try the rower as this will engage a lot of her muscle groups.    With exercise, she is not having pain in her chest heart palpitations dizziness lightheadedness syncope or near syncope.  She has no problems moving her bowels or passing.  She is attempting to eat fewer calories and is eating at what she would have eaten in the past.  She does have 1 day of cheating where she will have desserts for pizza but limits this to 2 slices per day.  Review of Systems: No recent viral syndrome.  She now has energy at the end of the day that she could play softball with her daughter or take her to the park and engage in more physical activities with her.  This is something that before her medication and attempt at improving her health that she was not able to do.    Objective   /70 (BP Location: Right arm, Patient Position: Sitting, Cuff Size: Standard)   Pulse 78   Temp 97.9 °F (36.6 °C) (Tympanic)   Wt 122 kg (268 lb 4.8 oz)   LMP 12/24/2020   SpO2 98%   BMI 43.30 kg/m²      Physical Exam: Well-developed well-nourished 49-year-old female oriented and cooperative with exam.  Blood pressure is controlled.  She is afebrile.    PHQ 2 score was 0.    Heart is regular rate without murmur gallops.  Lungs are clear to auscultation.  Administrative Statements   I have spent a total time of 25 minutes in caring for this  patient on the day of the visit/encounter including Prognosis, Risks and benefits of tx options, Instructions for management, Patient and family education, Importance of tx compliance, Impressions, Counseling / Coordination of care, Documenting in the medical record, Reviewing/placing orders in the medical record (including tests, medications, and/or procedures), and Obtaining or reviewing history      I will see the patient on May 5, 2025 for previously scheduled follow-up appointment.

## 2025-03-17 NOTE — ASSESSMENT & PLAN NOTE
Patient has been full dose generic Contrave medication for 1 month.  She states that she often gets 8 evening dosing of bupropion.  She states that she has not yet seen actual weight loss despite her tracking her food intake and her activity.  She has been very active at the gym 3 to 5 days/week standing up to 3 hours and remained 6748-7442 calories per trip to the gym.

## 2025-03-18 DIAGNOSIS — Z00.6 ENCOUNTER FOR EXAMINATION FOR NORMAL COMPARISON OR CONTROL IN CLINICAL RESEARCH PROGRAM: ICD-10-CM

## 2025-03-18 NOTE — ASSESSMENT & PLAN NOTE
Patient continuing Contrave medication generic equivalent due to her ongoing obesity.  Tolerating diet and activities for continued weight loss attempt.

## 2025-04-09 ENCOUNTER — APPOINTMENT (OUTPATIENT)
Dept: LAB | Facility: CLINIC | Age: 50
End: 2025-04-09

## 2025-04-09 DIAGNOSIS — Z00.6 ENCOUNTER FOR EXAMINATION FOR NORMAL COMPARISON OR CONTROL IN CLINICAL RESEARCH PROGRAM: ICD-10-CM

## 2025-04-09 PROCEDURE — 36415 COLL VENOUS BLD VENIPUNCTURE: CPT

## 2025-04-10 ENCOUNTER — OFFICE VISIT (OUTPATIENT)
Dept: OBGYN CLINIC | Facility: CLINIC | Age: 50
End: 2025-04-10

## 2025-04-10 VITALS — HEIGHT: 66 IN | BODY MASS INDEX: 42.43 KG/M2 | WEIGHT: 264 LBS

## 2025-04-10 DIAGNOSIS — M70.61 GREATER TROCHANTERIC BURSITIS OF BOTH HIPS: ICD-10-CM

## 2025-04-10 DIAGNOSIS — M70.51 PES ANSERINUS BURSITIS OF BOTH KNEES: Primary | ICD-10-CM

## 2025-04-10 DIAGNOSIS — M76.31 IT BAND SYNDROME, RIGHT: ICD-10-CM

## 2025-04-10 DIAGNOSIS — M70.52 PES ANSERINUS BURSITIS OF BOTH KNEES: Primary | ICD-10-CM

## 2025-04-10 DIAGNOSIS — M70.62 GREATER TROCHANTERIC BURSITIS OF BOTH HIPS: ICD-10-CM

## 2025-04-10 RX ORDER — BUPIVACAINE HYDROCHLORIDE 2.5 MG/ML
4 INJECTION, SOLUTION INFILTRATION; PERINEURAL
Status: COMPLETED | OUTPATIENT
Start: 2025-04-10 | End: 2025-04-10

## 2025-04-10 RX ORDER — TRIAMCINOLONE ACETONIDE 40 MG/ML
40 INJECTION, SUSPENSION INTRA-ARTICULAR; INTRAMUSCULAR
Status: COMPLETED | OUTPATIENT
Start: 2025-04-10 | End: 2025-04-10

## 2025-04-10 RX ORDER — CLINDAMYCIN HYDROCHLORIDE 300 MG/1
CAPSULE ORAL
COMMUNITY
Start: 2025-04-10

## 2025-04-10 RX ADMIN — TRIAMCINOLONE ACETONIDE 40 MG: 40 INJECTION, SUSPENSION INTRA-ARTICULAR; INTRAMUSCULAR at 13:15

## 2025-04-10 RX ADMIN — BUPIVACAINE HYDROCHLORIDE 4 ML: 2.5 INJECTION, SOLUTION INFILTRATION; PERINEURAL at 13:15

## 2025-04-10 NOTE — PROGRESS NOTES
Name: Brennen Cerna      : 1975      MRN: [id number]  Encounter Provider: Sy Pink DO  Encounter Date: 4/10/2025   Encounter department: [facility] ORTHOPEDICS [place]  :  Assessment & Plan  Pes anserinus bursitis of both knees         Greater trochanteric bursitis of both hips    Orders:    Ambulatory Referral to Physical Therapy; Future    It band syndrome, right        History of Present Illness   HPI  Brennen Cerna is a 49 y.o. female who presents follow-up evaluation of right knee pain.  Had Visco supplement injection 2024 R knee cortisone.  However her most prominent pain is in the medial shin and the lateral hip and knee region.  She reports that she has been working out at the gym and attempt to lose weight being in the gym 5 or 6 days/week for 2 to 3 hours a day.  She is lifting weights as well as doing elliptical and bicycle exercises.  She gets some anterior knee pain with doing knee extension exercises but is able to do leg press and leg curl exercises without as much discomfort.  She has more pain though the medial tibial area and lateral hip area.  She states the lateral hip area will wake her up at night.  She reports she has symptoms in both legs but is more prominent in the right leg.  She denies any back pain or pain radiating below the knee into the shin or ankle area.    Review of Systems  Current Outpatient Medications on File Prior to Visit   Medication Sig Dispense Refill    ARIPiprazole (ABILIFY DISCMELT) 15 mg dispersible tablet Take 15 mg by mouth daily      atorvastatin (LIPITOR) 40 mg tablet Take 1 tablet (40 mg total) by mouth daily 90 tablet 3    buPROPion (WELLBUTRIN) 100 mg tablet 2 tabs in a.m. and 2 tabs in p.m. 360 tablet 0    clindamycin (CLEOCIN) 300 MG capsule       lisinopril (ZESTRIL) 5 mg tablet Take 1 tablet (5 mg total) by mouth daily 90 tablet 3    lurasidone (LATUDA) 40 mg tablet Take 40 mg by mouth daily with breakfast       "naltrexone (REVIA) 50 mg tablet TAKE 1 TABLET IN MORNING 90 tablet 0     No current facility-administered medications on file prior to visit.      Social History     Tobacco Use    Smoking status: Former     Current packs/day: 0.00     Types: Cigarettes     Quit date:      Years since quittin.2     Passive exposure: Past    Smokeless tobacco: Never   Vaping Use    Vaping status: Former   Substance and Sexual Activity    Alcohol use: Not Currently    Drug use: Not Currently    Sexual activity: Not Currently      Objective   Ht 5' 6\" (1.676 m)   Wt 120 kg (264 lb)   LMP 2020   BMI 42.61 kg/m²      Physical Exam    Right knee notes tenderness with palpation laterally over the distal IT band extends up to the greater trochanter as well.  This was pain over the medial pretibial area about the pes anserine with extension into the right tendonosis chrysalis and sartorius area.  The more prominent discomfort with palpation of the right pes as opposed to the left pes area.  Also in the right greater troches and distal IT band as opposed to the left greater troches and IT band.  She does have pain over the lateral thigh with external rotation and abduction.  She is point tender over the greater troches.  She has no tenderness about the medial or lateral joint line of the knee and only mild patella facet tenderness on the lateral side.  Sensation is intact distally.    Large joint arthrocentesis: R greater trochanteric bursa  Universal Protocol:  Consent: Verbal consent obtained.  Risks and benefits: risks, benefits and alternatives were discussed  Consent given by: patient  Time out: Immediately prior to procedure a \"time out\" was called to verify the correct patient, procedure, equipment, support staff and site/side marked as required.  Timeout called at: 4/10/2025 1:54 PM.  Patient understanding: patient states understanding of the procedure being performed  Site marked: the operative site was " marked  Patient identity confirmed: verbally with patient  Supporting Documentation  Indications: pain   Procedure Details  Location: hip - R greater trochanteric bursa  Preparation: Patient was prepped and draped in the usual sterile fashion  Needle size: 22 G  Ultrasound guidance: no  Approach: lateral  Medications administered: 4 mL bupivacaine 0.25 %; 40 mg triamcinolone acetonide 40 mg/mL    Patient tolerance: patient tolerated the procedure well with no immediate complications  Dressing:  Sterile dressing applied      Foot/lower extremity injection    Performed by: Blayne Traylor PA-C  Authorized by: Blayne Traylor PA-C    Procedure:     Verbal consent obtained?: Yes      Risks and benefits: Risks, benefits and alternatives were discussed      Consent given by:  Patient    Time out performed at:  4/10/2025 1:55 PM    Patient states understanding of procedure being performed: Yes      Site marked: Yes      Patient identity confirmed:  Verbally with patient    Supporting Documentation:     Indications:  Tendon swelling and pain    Procedure Details:    Prep: patient was prepped and draped in usual sterile fashion      Approach:  Medial    Location comment:  Right pes anserine    Injection Information:       Medications:  4 mL bupivacaine 0.25 %; 40 mg triamcinolone acetonide 40 mg/mL

## 2025-04-10 NOTE — PATIENT INSTRUCTIONS
Patient was instructed that she needs to curtail her exercise activity as she is overdoing it and the body needs time to heal.  Recommendation was to be in the gym 3 or 4 days a week maximum and alternate activities.  She is to return to the gym until Monday for 1425.  Prescription was placed for physical therapy for IT band and pes anserine stretching.  She will be seen back in orthopedic office in 4 weeks.  Could consider reordering visco injection for her anterior knee pain if the other areas of discomfort are improved.  She may use ice and heat as well as Tylenol and 1 anti-inflammatory medication she will prefer to use over-the-counter ibuprofen as discussed in the office today.

## 2025-04-18 LAB
APOB+LDLR+PCSK9 GENE MUT ANL BLD/T: NOT DETECTED
BRCA1+BRCA2 DEL+DUP + FULL MUT ANL BLD/T: NOT DETECTED
MLH1+MSH2+MSH6+PMS2 GN DEL+DUP+FUL M: NOT DETECTED

## 2025-05-02 ENCOUNTER — RA CDI HCC (OUTPATIENT)
Dept: OTHER | Facility: HOSPITAL | Age: 50
End: 2025-05-02

## 2025-05-05 ENCOUNTER — EVALUATION (OUTPATIENT)
Dept: PHYSICAL THERAPY | Facility: CLINIC | Age: 50
End: 2025-05-05
Attending: PHYSICIAN ASSISTANT
Payer: MEDICARE

## 2025-05-05 ENCOUNTER — OFFICE VISIT (OUTPATIENT)
Dept: FAMILY MEDICINE CLINIC | Facility: CLINIC | Age: 50
End: 2025-05-05
Payer: MEDICARE

## 2025-05-05 VITALS
HEIGHT: 66 IN | HEART RATE: 70 BPM | DIASTOLIC BLOOD PRESSURE: 84 MMHG | OXYGEN SATURATION: 97 % | WEIGHT: 259.7 LBS | SYSTOLIC BLOOD PRESSURE: 133 MMHG | BODY MASS INDEX: 41.74 KG/M2 | TEMPERATURE: 97.5 F

## 2025-05-05 DIAGNOSIS — I10 ESSENTIAL HYPERTENSION: Primary | ICD-10-CM

## 2025-05-05 DIAGNOSIS — M70.62 GREATER TROCHANTERIC BURSITIS OF BOTH HIPS: ICD-10-CM

## 2025-05-05 DIAGNOSIS — M70.61 GREATER TROCHANTERIC BURSITIS OF RIGHT HIP: ICD-10-CM

## 2025-05-05 DIAGNOSIS — E78.2 MIXED HYPERLIPIDEMIA: ICD-10-CM

## 2025-05-05 DIAGNOSIS — L65.0 TELOGEN EFFLUVIUM: ICD-10-CM

## 2025-05-05 DIAGNOSIS — M76.31 IT BAND SYNDROME, RIGHT: Primary | ICD-10-CM

## 2025-05-05 DIAGNOSIS — M70.61 GREATER TROCHANTERIC BURSITIS OF BOTH HIPS: ICD-10-CM

## 2025-05-05 DIAGNOSIS — M17.11 PRIMARY OSTEOARTHRITIS OF RIGHT KNEE: ICD-10-CM

## 2025-05-05 DIAGNOSIS — J30.2 SEASONAL ALLERGIES: ICD-10-CM

## 2025-05-05 DIAGNOSIS — E66.813 CLASS 3 SEVERE OBESITY WITH SERIOUS COMORBIDITY AND BODY MASS INDEX (BMI) OF 40.0 TO 44.9 IN ADULT, UNSPECIFIED OBESITY TYPE: ICD-10-CM

## 2025-05-05 DIAGNOSIS — F25.1 SCHIZOAFFECTIVE DISORDER, DEPRESSIVE TYPE (HCC): ICD-10-CM

## 2025-05-05 PROCEDURE — 97140 MANUAL THERAPY 1/> REGIONS: CPT

## 2025-05-05 PROCEDURE — 99215 OFFICE O/P EST HI 40 MIN: CPT | Performed by: PHYSICIAN ASSISTANT

## 2025-05-05 PROCEDURE — 97535 SELF CARE MNGMENT TRAINING: CPT

## 2025-05-05 PROCEDURE — G2211 COMPLEX E/M VISIT ADD ON: HCPCS | Performed by: PHYSICIAN ASSISTANT

## 2025-05-05 RX ORDER — FLUTICASONE PROPIONATE 50 MCG
1 SPRAY, SUSPENSION (ML) NASAL DAILY
Qty: 18.2 ML | Refills: 11 | Status: SHIPPED | OUTPATIENT
Start: 2025-05-05

## 2025-05-05 NOTE — PROGRESS NOTES
PT Evaluation     Today's date: 2025  Patient name: Brennen Cerna  : 1975  MRN: [id number]  Referring provider: Blayne Traylor PA-C  Dx:   Encounter Diagnosis     ICD-10-CM    1. It band syndrome, right  M76.31 Ambulatory Referral to Physical Therapy      2. Greater trochanteric bursitis of both hips  M70.61 Ambulatory Referral to Physical Therapy    M70.62           Start Time: 1100  Stop Time: 1200  Total time in clinic (min): 60 minutes    Assessment  Impairments: abnormal coordination, abnormal gait, abnormal muscle firing, abnormal muscle tone, abnormal or restricted ROM, abnormal movement, activity intolerance, impaired balance, impaired physical strength, lacks appropriate home exercise program, pain with function, safety issue, weight-bearing intolerance, poor body mechanics, unable to perform ADL, participation limitations and activity limitations  Symptom irritability: moderate    Assessment details: Patient is a 50 y/o female who presents to physical therapy with BL hip pain R>L and R knee pain.  Patient has been having issues for the past several years.  Flare up seems to have been caused by increased physical activity in January.  Signs and symptoms consistent with trochanteric bursitis, pes anserine bursitis, and ITB syndrome.  Patient deficits include decreased hip mobility, valgus collapse, decreased LE strength, gait deficits, and soft tissue dysfunction of LE strength.  PT is performing manual therapy consisting of R SAD and R ITB STM and stretching to improve mobility and decrease pain.  HEP will be established next session to improve strength and stability.  Patient requires skilled physical therapy to improve gait tolerance, decrease pain, and return to normal exercise routine.  Plan to progress patient with exercise program to work towards goals.       Goals  STG 1  Patient will decrease hip and knee pain to 2/10 at worst to improve tolerance to therapy in 3 weeks.     STG 2   Patient will report 50% improvement in overall condition in 3 weeks to display improved quality of life.    STG 3  Patient will display normal gait pattern to display improved functional mobility in 3 weeks.      LTG 1 Patient will report no pain in hips and knee to display improved ability to perform ADLs in 6 weeks.    LTG 2  Patient will display 5/5 BLE strength to display improved functional gait and tolerance to exercise in 6 weeks.     Plan  Patient would benefit from: PT eval and skilled physical therapy  Referral necessary: No  Planned modality interventions: cryotherapy and thermotherapy: hydrocollator packs    Planned therapy interventions: abdominal trunk stabilization, activity modification, IASTM, joint mobilization, ADL retraining, manual therapy, balance, motor coordination training, balance/weight bearing training, neuromuscular re-education, patient education, postural training, fine motor coordination training, strengthening, stretching, therapeutic activities, therapeutic exercise, transfer training, home exercise program and flexibility    Frequency: 2x week  Duration in weeks: 6  Plan of Care beginning date: 5/5/2025  Plan of Care expiration date: 6/16/2025  Treatment plan discussed with: patient      Subjective Evaluation    History of Present Illness  Date of onset: 1/14/2025  Mechanism of injury: Patient reports she has been having a R hip and knee issue.  Patient reports she had hip issues for the past year and a half.  Patient reports the knee has been bothering her for about 6 years.  Both were insidious.  Patient reports she began working out in January.   This is when she had a significant increase in pain.   Patient reports the knee is more painful and that is what limits her ADL's and ambulation.  Patient had a gel injection in the knee last year which helped. Patient recently had a cortisone shot in the hip and knee.  They helped but are wearing off. Patient reports her R knee hurts  whenever she puts weight on it.  The hip hurts following biking or walking.   Quality of life: fair    Patient Goals  Patient goals for therapy: decreased pain, increased motion, decreased edema, increased strength, independence with ADLs/IADLs, return to sport/leisure activities and return to work    Pain  Current pain ratin  At best pain ratin  At worst pain ratin  Location: R knee medial aspect  Quality: pressure and sharp  Relieving factors: relaxation and rest  Aggravating factors: standing, walking, running and stair climbing    Social Support    Employment status: not working    Diagnostic Tests  X-ray: abnormal  Treatments  Current treatment: physical therapy      Objective     General Comments:      Hip Comments   Hip PROM  FLX: 100  EXT: 5  ABD: 40  IR: 15  ER: 45    R/L Hip MMT  FLX: 4/4  EXT: 4+/4+   ABD: 4+/4+  ADD:     R Knee PROM  FLX: 119  EXT: 0    R Knee AROM  FLX: 109  EXT: 0    Knee MMT  FLX: 4/4+  EXT: 4+/5    BL valgus noted with gait     Moderate TTP over greater troch, pes anserine bursa, and ITB insertion at knee             Precautions: Schizoaffective disorder, PVD    Daily Treatment Diary:      Initial Evaluation Date:  POC   Compliance             Visit Number 1            Re-Eval              Foto Captured Y                               Manual             R SAD, ITB STM, R ITB S 10' AT                                      Ther-Ex                                                                                                                                                                                      Neuro Re-Ed                                                                                                                     Ther-Act                                                                  Modalities

## 2025-05-05 NOTE — ASSESSMENT & PLAN NOTE
Patient is adherent with the atorvastatin which is significantly dropped her cardiovascular risk.

## 2025-05-05 NOTE — PROGRESS NOTES
Name: Brennen Cerna      : 1975      MRN: [id number]  Encounter Provider: Kelli Araya PA-C  Encounter Date: 2025   Encounter department: [facility]  :  Assessment & Plan  Essential hypertension  Patient's blood pressure meeting goals.  She is adherent with her lisinopril.       Mixed hyperlipidemia  Patient is adherent with the atorvastatin which is significantly dropped her cardiovascular risk.       Class 3 severe obesity with serious comorbidity and body mass index (BMI) of 40.0 to 44.9 in adult, unspecified obesity type  Patient is adherent with the generic equivalent of Contrave with naltrexone and bupropion.  She has lost 9 pounds and she continues to workout at the gym.  She is spending 2 to 3 hours at the gym and goes to the gym 3-4 times per week.  She does a variety of exercises including being on the elliptical 40 minutes the recumbent bike 30 to 50 minutes in overall exercise machine called an ACT for 12 minutes and she alternates weightlifting between upper and lower extremities.  She is having more energy and is no longer sleeping 18 hours/day.  She is still requiring 10 hours of sleep at night.         Schizoaffective disorder, depressive type (HCC)  Patient states that her mood is better than it has been for decades.  Other people are noticing how improved her mood is.  This could be related to the bupropion component of her Contrave.         Seasonal allergies  Patient's seasonal allergies did not respond to Allegra or Claritin.  She is going to try Flonase and I demonstrated the method by which this medication is not ministered.  Orders:    fluticasone (FLONASE) 50 mcg/act nasal spray; 1 spray into each nostril daily    Telogen effluvium  Patient was concerned about hair loss occurring in clumps.  We talked about telogen effluvium and this being a self-correcting condition.       Primary osteoarthritis of right knee  Patient is seeing  "Dr. Pink on 5/8/2025.  She was told she has significant medial compartment osteoarthritis of the right knee.  She did receive injections that lasted about 3 weeks and then recurred.  During her physical activity, she is not having pain but has a great deal pain afterwards.  I encouraged her to ice after activity which is what she was also told by Dr. Pink.       Greater trochanteric bursitis of right hip  Patient had injection into the right lateral hip and this injection continues to help her lateral hip pain.  He did              History of Present Illness   HPI: History as above.  She was seen today for weight loss follow-up.  She has lost 9 pounds since March.  She is happy with the weight loss along with being able to exercise for longer periods of time.  She is not having any pain in her chest heart palpitations dizziness or lightheadedness.    Her daughter will be finishing school in the next month.  She feels that she will not be able to go to the gym as readily because of her daughter being home.  I suggested that she freeze her current gym membership and join the [facility] where her daughter can participate in programs on a daily basis.  I think it is important for her to continue activity since she is working hard to get to this point.    Joint pain as above.  No peripheral edema.  Also encouraged her to get a compression sleeve for her knee which can help with pain also.  Review of Systems: No recent URI or viral syndrome.    Objective   /84 (BP Location: Left arm, Patient Position: Sitting, Cuff Size: Large)   Pulse 70   Temp 97.5 °F (36.4 °C) (Esophageal)   Ht 5' 6\" (1.676 m)   Wt 118 kg (259 lb 11.2 oz)   LMP 12/24/2020   SpO2 97%   BMI 41.92 kg/m²      Physical Exam: Reviewed vital signs.  She is normotensive and afebrile.    Heart is regular rate without murmur rub or gallop.  Lungs are clear to auscultation.    She has tenderness along the medial joint line of the right knee.  No " peripheral edema.  Administrative Statements   I have spent a total time of 40 minutes in caring for this patient on the day of the visit/encounter including Diagnostic results, Prognosis, Risks and benefits of tx options, Instructions for management, Patient and family education, Importance of tx compliance, Risk factor reductions, Impressions, Counseling / Coordination of care, Documenting in the medical record, Reviewing/placing orders in the medical record (including tests, medications, and/or procedures), and Obtaining or reviewing history      I will see her on or after September 26 for her Medicare annual wellness visit.  I congratulated her on the achievements and determination that she has had in becoming healthier..

## 2025-05-05 NOTE — ASSESSMENT & PLAN NOTE
Patient states that her mood is better than it has been for decades.  Other people are noticing how improved her mood is.  This could be related to the bupropion component of her Contrave.

## 2025-05-05 NOTE — ASSESSMENT & PLAN NOTE
Patient is seeing Dr. Pink on 5/8/2025.  She was told she has significant medial compartment osteoarthritis of the right knee.  She did receive injections that lasted about 3 weeks and then recurred.  During her physical activity, she is not having pain but has a great deal pain afterwards.  I encouraged her to ice after activity which is what she was also told by Dr. Pink.

## 2025-05-06 PROCEDURE — 97162 PT EVAL MOD COMPLEX 30 MIN: CPT

## 2025-05-06 RX ORDER — BUPROPION HYDROCHLORIDE 100 MG/1
TABLET ORAL
Qty: 120 TABLET | Refills: 2 | Status: SHIPPED | OUTPATIENT
Start: 2025-05-06

## 2025-05-08 ENCOUNTER — OFFICE VISIT (OUTPATIENT)
Dept: OBGYN CLINIC | Facility: CLINIC | Age: 50
End: 2025-05-08
Payer: MEDICARE

## 2025-05-08 ENCOUNTER — OFFICE VISIT (OUTPATIENT)
Dept: PHYSICAL THERAPY | Facility: CLINIC | Age: 50
End: 2025-05-08
Attending: PHYSICIAN ASSISTANT
Payer: MEDICARE

## 2025-05-08 VITALS — WEIGHT: 259 LBS | BODY MASS INDEX: 41.62 KG/M2 | HEIGHT: 66 IN

## 2025-05-08 DIAGNOSIS — G89.29 CHRONIC PAIN OF RIGHT KNEE: ICD-10-CM

## 2025-05-08 DIAGNOSIS — M70.62 GREATER TROCHANTERIC BURSITIS OF BOTH HIPS: Primary | ICD-10-CM

## 2025-05-08 DIAGNOSIS — M17.11 PRIMARY OSTEOARTHRITIS OF RIGHT KNEE: Primary | ICD-10-CM

## 2025-05-08 DIAGNOSIS — M76.31 IT BAND SYNDROME, RIGHT: ICD-10-CM

## 2025-05-08 DIAGNOSIS — M70.61 GREATER TROCHANTERIC BURSITIS OF BOTH HIPS: Primary | ICD-10-CM

## 2025-05-08 DIAGNOSIS — E66.813 CLASS 3 SEVERE OBESITY WITH BODY MASS INDEX (BMI) OF 40.0 TO 44.9 IN ADULT: ICD-10-CM

## 2025-05-08 DIAGNOSIS — M25.561 CHRONIC PAIN OF RIGHT KNEE: ICD-10-CM

## 2025-05-08 PROCEDURE — 97112 NEUROMUSCULAR REEDUCATION: CPT

## 2025-05-08 PROCEDURE — 97110 THERAPEUTIC EXERCISES: CPT

## 2025-05-08 PROCEDURE — 99213 OFFICE O/P EST LOW 20 MIN: CPT | Performed by: ORTHOPAEDIC SURGERY

## 2025-05-08 PROCEDURE — 97140 MANUAL THERAPY 1/> REGIONS: CPT

## 2025-05-08 NOTE — PROGRESS NOTES
"Name: Brennen Cerna      : 1975      MRN: [id number]  Encounter Provider: Sy Pink DO  Encounter Date: 2025   Encounter department: [facility] [place]  :  Assessment & Plan  Primary osteoarthritis of right knee  Further treatment was discussed.  She did well with viscosupplement injection for her right knee in 2024 and would like to proceed with another viscosupplement injection.  A request for approval was placed.  I will see her once this has been approved.  She initiated physical therapy for her hip and knee within the past week and should continue physical therapy.  She is to contact me if any questions or concerns arise in the interim.  Orders:    Injection procedure prior authorization; Future    Chronic pain of right knee         Class 3 severe obesity with body mass index (BMI) of 40.0 to 44.9 in adult             History of Present Illness   HPI  Brennen Cerna is a 49 y.o. female who presents for reevaluation of her right hip and right knee.  She states that the right hip is done well and she has no significant pain since the injection to her right greater trochanteric bursa.  Her right knee did well up until about 7 to 10 days ago when she began experiencing recurring right knee pain.  Pain is localized medially and laterally.  She also notes some posterior pain.  Pain is described as being 5/10 on a 0-to-10 scale.  She denies pain at rest but does note start up pain and stiffness as well as increasing pain with more prolonged activity.  She has not been able to resume powerwalking to assist with her weight loss.  She can walk for approximately 1 mile and then pain becomes significant enough that she needs to limit further walking.  She has continued to workout in the gym but has to modify her exercise program to avoid aggravating her knee symptoms.      Review of Systems       Objective   Ht 5' 6\" (1.676 m)   Wt 117 kg (259 lb)   LMP 2020   " BMI 41.80 kg/m²      Physical Exam  The exam today demonstrates full extension of the knee without complaints of the full extension position but with complaints as she approached full extension.  She has 110 degrees of flexion with complaints of posterior pain.  She has some tenderness over the medial femoral condyle, joint line and medial tibial plateau.  The anterior and lateral knee were nontender.  Cruciate and collateral ligaments are grossly stable although a mild degree of varus deformity can be corrected toward neutral with valgus stress but without complaints.    The greater trochanteric bursa is nontender and she denies complaints with hip range of motion.

## 2025-05-08 NOTE — HOME EXERCISE EDUCATION
Program_ID:[id number]   Access Code: [email or web address]  URL: [email or web address]  Date: 05-  Prepared By: Mitesh Burrows    Program Notes      Exercises      - Clamshell - 3 x daily - 7 x weekly - 1 sets - 20 reps      - Supine Posterior Pelvic Tilt - 3 x daily - 7 x weekly - 1 sets - 20 reps      - Supine March - 3 x daily - 7 x weekly - 1 sets - 20 reps      - Supine Active Straight Leg Raise - 3 x daily - 7 x weekly - 1 sets - 20 reps      - Modified Gilbert Stretch - 3 x daily - 7 x weekly - 1 sets - 1 reps - 3min hold

## 2025-05-08 NOTE — ASSESSMENT & PLAN NOTE
Further treatment was discussed.  She did well with viscosupplement injection for her right knee in June 2024 and would like to proceed with another viscosupplement injection.  A request for approval was placed.  I will see her once this has been approved.  She initiated physical therapy for her hip and knee within the past week and should continue physical therapy.  She is to contact me if any questions or concerns arise in the interim.  Orders:    Injection procedure prior authorization; Future

## 2025-05-08 NOTE — PROGRESS NOTES
Daily Note     Today's date: 2025  Patient name: Brennen Cerna  : 1975  MRN: [id number]  Referring provider: Blayne Traylor PA-C  Dx:   Encounter Diagnosis     ICD-10-CM    1. Greater trochanteric bursitis of both hips  M70.61     M70.62       2. It band syndrome, right  M76.31           Start Time: 945  Stop Time: 1025  Total time in clinic (min): 40 minutes    Subjective: Patient reports she felt good after manual therapy at .  Patient reports her hips are still fairly sore.      Objective: See treatment diary below      Assessment: Tolerated treatment well. Patient would benefit from continued PT.  Patient was provided HEP today and it was performed in clinic with cueing required for proper form of exercises.  Patient did well with exercises as well as dynamic warmup on NuStep.  Patient continued to have positive response to manual therapy.  Will progress with functional exercises as tolerated next session.      Plan: Continue per plan of care.      Precautions: Schizoaffective disorder, PVD    Daily Treatment Diary:      Initial Evaluation Date:  POC   Compliance            Visit Number 1 2           Re-Eval              Foto Captured Y                              Manual             R SAD, ITB STM, R ITB S 10' AT 10' AT                                     Ther-Ex             NuStep  10' L1 10' L1           Gilbert Stretch  3' BL                                                                                                                                                          Neuro Re-Ed             Supine March  20x            Clams  20x            SLR w/ TAC  20x           PPT  20x                                                               Ther-Act                                                                  Modalities

## 2025-05-12 ENCOUNTER — OFFICE VISIT (OUTPATIENT)
Dept: PHYSICAL THERAPY | Facility: CLINIC | Age: 50
End: 2025-05-12
Attending: PHYSICIAN ASSISTANT
Payer: MEDICARE

## 2025-05-12 DIAGNOSIS — M76.31 IT BAND SYNDROME, RIGHT: ICD-10-CM

## 2025-05-12 DIAGNOSIS — M70.62 GREATER TROCHANTERIC BURSITIS OF BOTH HIPS: Primary | ICD-10-CM

## 2025-05-12 DIAGNOSIS — M70.61 GREATER TROCHANTERIC BURSITIS OF BOTH HIPS: Primary | ICD-10-CM

## 2025-05-12 PROCEDURE — 97140 MANUAL THERAPY 1/> REGIONS: CPT

## 2025-05-12 PROCEDURE — 97112 NEUROMUSCULAR REEDUCATION: CPT

## 2025-05-12 PROCEDURE — 97110 THERAPEUTIC EXERCISES: CPT

## 2025-05-12 NOTE — PROGRESS NOTES
"Daily Note     Today's date: 2025  Patient name: Brennen Cerna  : 1975  MRN: [id number]  Referring provider: Blayne Traylor PA-C  Dx:   Encounter Diagnosis     ICD-10-CM    1. Greater trochanteric bursitis of both hips  M70.61     M70.62       2. It band syndrome, right  M76.31           Start Time: 1345  Stop Time: 1430  Total time in clinic (min): 45 minutes    Subjective: Patient reports she is seeing some progress.  Patient reports       Objective: See treatment diary below      Assessment: Tolerated treatment well. Patient would benefit from continued PT.  PT performed manual therapy consisting of R SAD, ITB STM and ITB stretch to improve mobility and decrease pain in R knee and hip.  Patient continued TherEx to improve hip mobility and soft tissue extensibility. Patient continued Neuro Muscular Re-Ed to improve posterior muscle chain activation and improve hip control.  Monster walk and standing hip planes added to progress patient.  Patient would benefit from skilled PT to address functional deficits and pain.       Plan: Continue per plan of care.  Progress treatment as tolerated.       Precautions: Schizoaffective disorder, PVD    Daily Treatment Diary:      Initial Evaluation Date:  POC   Compliance           Visit Number 1 2 3          Re-Eval              Foto Captured Y                             Manual             R SAD, ITB STM, R ITB S 10' AT 10' AT 10' AT                                    Ther-Ex             NuStep  10' L1 10' L1 10' L1          Gilbert Stretch  3' BL 3' BL          Hip Planes   20x ea 2#                                                                                                                                            Neuro Re-Ed             Supine March  20x  20x          Clams  20x  20x           SLR w/ TAC  20x 20x           PPT  20x 20x 5\"          Monster Walk   3 laps           Mini Squat    20x                          "            Ther-Act                                                                  Modalities

## 2025-05-15 ENCOUNTER — OFFICE VISIT (OUTPATIENT)
Dept: PHYSICAL THERAPY | Facility: CLINIC | Age: 50
End: 2025-05-15
Attending: PHYSICIAN ASSISTANT
Payer: MEDICARE

## 2025-05-15 DIAGNOSIS — M70.62 GREATER TROCHANTERIC BURSITIS OF BOTH HIPS: Primary | ICD-10-CM

## 2025-05-15 DIAGNOSIS — M76.31 IT BAND SYNDROME, RIGHT: ICD-10-CM

## 2025-05-15 DIAGNOSIS — M70.61 GREATER TROCHANTERIC BURSITIS OF BOTH HIPS: Primary | ICD-10-CM

## 2025-05-15 PROCEDURE — 97110 THERAPEUTIC EXERCISES: CPT

## 2025-05-15 PROCEDURE — 97140 MANUAL THERAPY 1/> REGIONS: CPT

## 2025-05-15 NOTE — PROGRESS NOTES
"Daily Note     Today's date: 5/15/2025  Patient name: Brennen Cerna  : 1975  MRN: [id number]  Referring provider: Blayne Traylor PA-C  Dx:   Encounter Diagnosis     ICD-10-CM    1. Greater trochanteric bursitis of both hips  M70.61     M70.62       2. It band syndrome, right  M76.31           Start Time: 1100  Stop Time: 1145  Total time in clinic (min): 45 minutes    Subjective: Patient reports continued improvement in hip and knee pain.      Objective: See treatment diary below      Assessment: Tolerated treatment well. Patient would benefit from continued PT.  PT performed manual therapy consisting of R SAD, ITB STM, and ITB stretch to improve mobility and decrease pain in hips and R knee.  Patient continued TherEx to improve hip strength and stability . Patient continued Neuro Muscular Re-Ed to improve quad control and posterior chain muscle activation.  Resistance was added to supine march and clams.  Patient would benefit from skilled PT to address functional deficits and pain.       Plan: Continue per plan of care.  Progress treatment as tolerated.       Precautions: Schizoaffective disorder, PVD    Daily Treatment Diary:      Initial Evaluation Date:  POC   Compliance 5/5 5/8 5/12 5/15         Visit Number 1 2 3 4         Re-Eval              Foto Captured Y                   5/5 5/8 5/12 5/15         Manual             R SAD, ITB STM, R ITB S 10' AT 10' AT 10' AT 10' AT                                   Ther-Ex             NuStep  10' L1 10' L1 10' L1 10' L3         Gilbert Stretch  3' BL 3' BL 3' BL         Hip Planes   20x ea 2# 20x ea 2#                                                                                                                                            Neuro Re-Ed             Supine March  20x  20x 20x grn         Clams  20x  20x  20x grn         SLR w/ TAC  20x 20x  20x          PPT  20x 20x 5\" 20x 5\"         Monster Walk   3 laps  3 laps         Mini Squat    " 20x  20x                                   Ther-Act                                                                  Modalities

## 2025-05-20 ENCOUNTER — OFFICE VISIT (OUTPATIENT)
Dept: PHYSICAL THERAPY | Facility: CLINIC | Age: 50
End: 2025-05-20
Attending: PHYSICIAN ASSISTANT
Payer: MEDICARE

## 2025-05-20 DIAGNOSIS — M76.31 IT BAND SYNDROME, RIGHT: ICD-10-CM

## 2025-05-20 DIAGNOSIS — M70.62 GREATER TROCHANTERIC BURSITIS OF BOTH HIPS: Primary | ICD-10-CM

## 2025-05-20 DIAGNOSIS — M70.61 GREATER TROCHANTERIC BURSITIS OF BOTH HIPS: Primary | ICD-10-CM

## 2025-05-20 PROCEDURE — 97112 NEUROMUSCULAR REEDUCATION: CPT

## 2025-05-20 PROCEDURE — 97140 MANUAL THERAPY 1/> REGIONS: CPT

## 2025-05-20 PROCEDURE — 97110 THERAPEUTIC EXERCISES: CPT

## 2025-05-20 NOTE — PROGRESS NOTES
Daily Note     Today's date: 2025  Patient name: Brennen Cerna  : 1975  MRN: [id number]  Referring provider: Blayne Traylor PA-C  Dx:   Encounter Diagnosis     ICD-10-CM    1. Greater trochanteric bursitis of both hips  M70.61     M70.62       2. It band syndrome, right  M76.31           Start Time: 1353  Stop Time: 1431  Total time in clinic (min): 38 minutes    Subjective: Patient reports that her knee feels good and no hip pain today.  Patient states she had some R hip pain yesterday at the gym after using the cardio equipment.      Objective: See treatment diary below      Assessment: Tolerated treatment well.   Patient participated in skilled PT session focused on strengthening, stretching, and ROM.  Patient able to complete exercise program with no issues.  Patient presents with increased tightness with R ITB.  Patient experience no pain in R hip this session.  Patient need v.c. for proper technique with exercises.  Patient would continue to benefit from skilled PT interventions to address strengthening, stretching, and ROM. Patient demonstrated fatigue post treatment      Plan: Continue per plan of care.      Precautions: Schizoaffective disorder, PVD    Daily Treatment Diary:      Initial Evaluation Date:  POC   Compliance 5/5 5/8 5/12 5/15 5/20        Visit Number 1 2 3 4 5        Re-Eval              Foto Captured Y                   5/5 5/8 5/12 5/15 5/20        Manual             R SAD, ITB STM, R ITB S 10' AT 10' AT 10' AT 10' AT 8' CD                                  Ther-Ex             NuStep  10' L1 10' L1 10' L1 10' L3 10' L3        Gilbert Stretch  3' BL 3' BL 3' BL 3' B/L        Hip Planes   20x ea 2# 20x ea 2#  20x ea   2#                                                                                                                                          Neuro Re-Ed             Supine March  20x  20x 20x grn 20x grn        Clams  20x  20x  20x grn 20x grn        SLR w/  "TAC  20x 20x  20x  20x         PPT  20x 20x 5\" 20x 5\" 5\"20x        Monster Walk   3 laps  3 laps 3 laps        Mini Squat    20x  20x 20x                                  Ther-Act                                                                  Modalities                                                                                  "

## 2025-05-22 ENCOUNTER — APPOINTMENT (OUTPATIENT)
Dept: PHYSICAL THERAPY | Facility: CLINIC | Age: 50
End: 2025-05-22
Attending: PHYSICIAN ASSISTANT
Payer: MEDICARE

## 2025-05-27 ENCOUNTER — APPOINTMENT (OUTPATIENT)
Dept: PHYSICAL THERAPY | Facility: CLINIC | Age: 50
End: 2025-05-27
Attending: PHYSICIAN ASSISTANT
Payer: MEDICARE

## 2025-05-29 ENCOUNTER — OFFICE VISIT (OUTPATIENT)
Dept: PHYSICAL THERAPY | Facility: CLINIC | Age: 50
End: 2025-05-29
Attending: PHYSICIAN ASSISTANT
Payer: MEDICARE

## 2025-05-29 DIAGNOSIS — M76.31 IT BAND SYNDROME, RIGHT: ICD-10-CM

## 2025-05-29 DIAGNOSIS — M70.61 GREATER TROCHANTERIC BURSITIS OF BOTH HIPS: Primary | ICD-10-CM

## 2025-05-29 DIAGNOSIS — M70.62 GREATER TROCHANTERIC BURSITIS OF BOTH HIPS: Primary | ICD-10-CM

## 2025-05-29 PROCEDURE — 97110 THERAPEUTIC EXERCISES: CPT

## 2025-05-29 PROCEDURE — 97140 MANUAL THERAPY 1/> REGIONS: CPT

## 2025-05-29 PROCEDURE — 97112 NEUROMUSCULAR REEDUCATION: CPT

## 2025-05-29 NOTE — PROGRESS NOTES
Daily Note     Today's date: 2025  Patient name: Brennen Cerna  : 1975  MRN: [id number]  Referring provider: Blayne Traylor PA-C  Dx:   Encounter Diagnosis     ICD-10-CM    1. Greater trochanteric bursitis of both hips  M70.61     M70.62       2. It band syndrome, right  M76.31           Start Time: 1100  Stop Time: 1145  Total time in clinic (min): 45 minutes    Subjective: Patient reports she is doing well today.  She reports she had some pain       Objective: See treatment diary below      Assessment: Tolerated treatment well. Patient would benefit from continued PT.  PT performed manual therapy consisting of hip and ITB STM to improve mobility and decrease pain in hips and knees .  Patient continued TherEx to improve LE strength and mobility. Patient continued Neuro Muscular Re-Ed to improve core control and stability.  Leg press was added as well as increased resistance for table exercises  Patient would benefit from skilled PT to address functional deficits and pain.       Plan: Continue per plan of care.  Progress treatment as tolerated.       Precautions: Schizoaffective disorder, PVD    Daily Treatment Diary:      Initial Evaluation Date:  POC   Compliance 5/5 5/8 5/12 5/15 5/20 5/29       Visit Number 1 2 3 4 5 6       Re-Eval              Foto Captured Y                   5/5 5/8 5/12 5/15 5/20 5/29       Manual             R SAD, ITB STM, R ITB S 10' AT 10' AT 10' AT 10' AT 8' CD 10' AT                                 Ther-Ex             NuStep  10' L1 10' L1 10' L1 10' L3 10' L3 10' L3       Gilbert Stretch  3' BL 3' BL 3' BL 3' B/L 3' B/L       Hip Planes   20x ea 2# 20x ea 2#  20x ea   2# 20x ea 3#       Leg Press      20x 145#                                                                                                                            Neuro Re-Ed             Supine March  20x  20x 20x grn 20x grn 20x grn       Clams  20x  20x  20x grn 20x grn 20x grn       SLR w/  "TAC  20x 20x  20x  20x  20x 2#       PPT  20x 20x 5\" 20x 5\" 5\"20x 20x w/ mach       Monster Walk   3 laps  3 laps 3 laps 3 laps       Mini Squat    20x  20x 20x 20x                                  Ther-Act                                                                  Modalities                                                                                    "

## 2025-05-30 ENCOUNTER — TELEPHONE (OUTPATIENT)
Age: 50
End: 2025-05-30

## 2025-05-30 NOTE — TELEPHONE ENCOUNTER
Caller: Patient    Doctor: Dr. Pink / Rajendra    Reason for call: Patient states she is having pain on the outside joint of her right knee and wants to know if the Visco inj will help wit this? She states the CSI she had done helped with the inner pain in her knee. Please advise.     Call back#: [phone] (OK to [facility] if no answer)

## 2025-06-03 ENCOUNTER — OFFICE VISIT (OUTPATIENT)
Dept: PHYSICAL THERAPY | Facility: CLINIC | Age: 50
End: 2025-06-03
Attending: PHYSICIAN ASSISTANT
Payer: MEDICARE

## 2025-06-03 DIAGNOSIS — M70.62 GREATER TROCHANTERIC BURSITIS OF BOTH HIPS: Primary | ICD-10-CM

## 2025-06-03 DIAGNOSIS — M76.31 IT BAND SYNDROME, RIGHT: ICD-10-CM

## 2025-06-03 DIAGNOSIS — M70.61 GREATER TROCHANTERIC BURSITIS OF BOTH HIPS: Primary | ICD-10-CM

## 2025-06-03 PROCEDURE — 97110 THERAPEUTIC EXERCISES: CPT

## 2025-06-03 PROCEDURE — 97112 NEUROMUSCULAR REEDUCATION: CPT

## 2025-06-03 PROCEDURE — 97140 MANUAL THERAPY 1/> REGIONS: CPT

## 2025-06-03 NOTE — HOME EXERCISE EDUCATION
Program_ID:[id number]   Access Code: [email or web address]  URL: [email or web address]  Date: 06-  Prepared By: Mitesh Burrows    Program Notes      Exercises      - Sit to Stand - 3 x daily - 7 x weekly - 1 sets - 20 reps      - Standing Hip Abduction with Counter Support - 3 x daily - 7 x weekly - 1 sets - 20 reps      - Seated Long Arc Quad - 3 x daily - 7 x weekly - 1 sets - 20 reps      - Supine Bridge - 3 x daily - 7 x weekly - 1 sets - 20 reps      - Supine Bridge with Resistance Band - 3 x daily - 7 x weekly - 1 sets - 20 reps      - Supine Bridge with Mini Swiss Ball Between Knees - 3 x daily - 7 x weekly - 1 sets - 20 reps      - Side Stepping with Resistance at Thighs - 3 x daily - 7 x weekly - 1 sets - 5 reps      - Standing Hip Extension with Counter Support - 3 x daily - 7 x weekly - 1 sets - 20 reps

## 2025-06-03 NOTE — PROGRESS NOTES
"Daily Note     Today's date: 6/3/2025  Patient name: Brennen Cerna  : 1975  MRN: [id number]  Referring provider: Blayne Traylor PA-C  Dx:   Encounter Diagnosis     ICD-10-CM    1. Greater trochanteric bursitis of both hips  M70.61     M70.62       2. It band syndrome, right  M76.31           Start Time: 1400  Stop Time: 1445  Total time in clinic (min): 45 minutes    Subjective: Patient reports she is sore today.  Patient reports she was doing a lot of yardwork and her hamstrings are sore and tight today.        Objective: See treatment diary below      Assessment: Tolerated treatment well. Patient would benefit from continued PT.  PT performed manual therapy consisting of hamstring STM to improve mobility and decrease pain in BLE.  Patient continued TherEx to improve LE mobility and strength. Patient continued Neuro Muscular Re-Ed to improve posterior chain muscle activation.  Resistance bands were progressed today.  Patient would benefit from skilled PT to address functional deficits and pain.       Plan: Continue per plan of care.  Progress treatment as tolerated.       Precautions: Schizoaffective disorder, PVD    Daily Treatment Diary:      Initial Evaluation Date:  POC   Compliance 5/5 5/8 5/12 5/15 5/20 5/29 6/3      Visit Number 1 2 3 4 5 6 7      Re-Eval              Foto Captured Y      Y             5/5 5/8 5/12 5/15 5/20 5/29 6/3      Manual             R SAD, ITB STM, R ITB S 10' AT 10' AT 10' AT 10' AT 8' CD 10' AT 10' AT                                Ther-Ex             NuStep  10' L1 10' L1 10' L1 10' L3 10' L3 10' L3 10' L3      Gilbert Stretch  3' BL 3' BL 3' BL 3' B/L 3' B/L 3' B/L      Hip Planes   20x ea 2# 20x ea 2#  20x ea   2# 20x ea 3# 20x 3#      Leg Press      20x 145# 20x 145#      Standing HS stretch        3x30\" BL                                                                                                              Neuro Re-Ed             Supine March  20x  " "20x 20x grn 20x grn 20x grn 20x chaitanya      Clams  20x  20x  20x grn 20x grn 20x grn 20x chaitanya      SLR w/ TAC  20x 20x  20x  20x  20x 2# 20x 2#      PPT  20x 20x 5\" 20x 5\" 5\"20x 20x w/ mach 20x w/ march      Monster Walk   3 laps  3 laps 3 laps 3 laps 3 laps orange       Mini Squat    20x  20x 20x 20x  20x                                Ther-Act                                                                  Modalities                                                                                      "

## 2025-06-06 ENCOUNTER — TELEPHONE (OUTPATIENT)
Age: 50
End: 2025-06-06

## 2025-06-06 DIAGNOSIS — E66.813 CLASS 3 SEVERE OBESITY WITH SERIOUS COMORBIDITY AND BODY MASS INDEX (BMI) OF 40.0 TO 44.9 IN ADULT, UNSPECIFIED OBESITY TYPE: ICD-10-CM

## 2025-06-06 NOTE — TELEPHONE ENCOUNTER
"Caller: Brennen    Reason for call: Patient has an appointment 06/09/25 to discuss visco. Was suppose to have the visco originally but wanted to speak to Dr. Pink before she goes through with it. I could not change the Visit Type to a Follow up. Just in case she still ends up getting it I left the appointment type as \"Visco\" And referral is attach.    In the case she does not go through with the visco, please unassign the Injection Referral from the appointment.    Thanks.     /////Also Attaching Auth Team////      "

## 2025-06-09 ENCOUNTER — APPOINTMENT (OUTPATIENT)
Dept: PHYSICAL THERAPY | Facility: CLINIC | Age: 50
End: 2025-06-09
Attending: PHYSICIAN ASSISTANT
Payer: MEDICARE

## 2025-06-09 ENCOUNTER — OFFICE VISIT (OUTPATIENT)
Dept: OBGYN CLINIC | Facility: CLINIC | Age: 50
End: 2025-06-09
Payer: MEDICARE

## 2025-06-09 VITALS — WEIGHT: 251.6 LBS | BODY MASS INDEX: 40.43 KG/M2 | HEIGHT: 66 IN

## 2025-06-09 DIAGNOSIS — M17.11 PRIMARY OSTEOARTHRITIS OF RIGHT KNEE: Primary | ICD-10-CM

## 2025-06-09 DIAGNOSIS — E66.813 CLASS 3 SEVERE OBESITY WITH BODY MASS INDEX (BMI) OF 40.0 TO 44.9 IN ADULT: ICD-10-CM

## 2025-06-09 PROCEDURE — 20610 DRAIN/INJ JOINT/BURSA W/O US: CPT | Performed by: ORTHOPAEDIC SURGERY

## 2025-06-09 NOTE — ASSESSMENT & PLAN NOTE
Under aseptic technique the right knee was injected with Durolane.  Follow-up was discussed and she prefers to return only if symptoms do not improve.  I have encouraged her to contact me if any questions or concerns arise.

## 2025-06-09 NOTE — PROGRESS NOTES
":  Assessment & Plan  Primary osteoarthritis of right knee  Under aseptic technique the right knee was injected with Durolane.  Follow-up was discussed and she prefers to return only if symptoms do not improve.  I have encouraged her to contact me if any questions or concerns arise.       Class 3 severe obesity with body mass index (BMI) of 40.0 to 44.9 in adult             History of Present Illness     Brennen Cerna is a 49 y.o. female presenting for viscosupplement injection for her right knee.  Her right knee continues to bother her although she does feel there has been some improvement along the medial aspect of the knee, with no significant benefit laterally.  HPI  Review of Systems  Objective   Ht 5' 6\" (1.676 m)   Wt 114 kg (251 lb 9.6 oz)   LMP 12/24/2020   BMI 40.61 kg/m²      Physical Exam  Exam demonstrates no significant effusion.  The skin is warm and dry.    Large joint arthrocentesis: R knee    Performed by: Sy Pink DO  Authorized by: Sy Pink DO    Universal Protocol:  Consent: Verbal consent obtained  Consent given by: patient  Patient understanding: patient states understanding of the procedure being performed  Supporting Documentation  Indications: pain     Is this a Visco injection? Yes  Non-Pharmacologic Treatments Attempted: Diet, Physical Therapy, Home Exercise and Weight Management Counseling  Pharmacologic Treatments Attempted: OTC anti-inflammatories and Tylenol  Pain Score: 5Procedure Details  Location: knee - R knee  Needle size: 22 G  Ultrasound guidance: no  Approach: anterolateral  Medications administered: 3 mL sodium hyaluronate 60 MG/3ML              "

## 2025-06-10 RX ORDER — NALTREXONE HYDROCHLORIDE 50 MG/1
TABLET, FILM COATED ORAL
Qty: 90 TABLET | Refills: 0 | Status: SHIPPED | OUTPATIENT
Start: 2025-06-10

## 2025-06-13 ENCOUNTER — APPOINTMENT (OUTPATIENT)
Dept: PHYSICAL THERAPY | Facility: CLINIC | Age: 50
End: 2025-06-13
Attending: PHYSICIAN ASSISTANT
Payer: MEDICARE

## 2025-06-17 ENCOUNTER — APPOINTMENT (OUTPATIENT)
Dept: PHYSICAL THERAPY | Facility: CLINIC | Age: 50
End: 2025-06-17
Attending: PHYSICIAN ASSISTANT
Payer: MEDICARE

## 2025-06-23 ENCOUNTER — APPOINTMENT (OUTPATIENT)
Dept: PHYSICAL THERAPY | Facility: CLINIC | Age: 50
End: 2025-06-23
Attending: PHYSICIAN ASSISTANT
Payer: MEDICARE

## 2025-06-23 DIAGNOSIS — M17.11 PRIMARY OSTEOARTHRITIS OF RIGHT KNEE: Primary | ICD-10-CM

## 2025-06-23 DIAGNOSIS — I10 ESSENTIAL HYPERTENSION: ICD-10-CM

## 2025-06-23 DIAGNOSIS — E78.2 MIXED HYPERLIPIDEMIA: ICD-10-CM

## 2025-06-23 RX ORDER — ATORVASTATIN CALCIUM 40 MG/1
40 TABLET, FILM COATED ORAL DAILY
Qty: 90 TABLET | Refills: 1 | Status: SHIPPED | OUTPATIENT
Start: 2025-06-23

## 2025-06-23 RX ORDER — DICLOFENAC POTASSIUM 50 MG/1
50 TABLET, FILM COATED ORAL 2 TIMES DAILY
Qty: 30 TABLET | Refills: 0 | Status: SHIPPED | OUTPATIENT
Start: 2025-06-23

## 2025-06-23 RX ORDER — LISINOPRIL 5 MG/1
5 TABLET ORAL DAILY
Qty: 90 TABLET | Refills: 1 | Status: SHIPPED | OUTPATIENT
Start: 2025-06-23

## 2025-06-26 ENCOUNTER — PATIENT MESSAGE (OUTPATIENT)
Dept: FAMILY MEDICINE CLINIC | Facility: CLINIC | Age: 50
End: 2025-06-26

## 2025-06-27 ENCOUNTER — APPOINTMENT (EMERGENCY)
Dept: CT IMAGING | Facility: HOSPITAL | Age: 50
End: 2025-06-27
Payer: MEDICARE

## 2025-06-27 ENCOUNTER — HOSPITAL ENCOUNTER (EMERGENCY)
Facility: HOSPITAL | Age: 50
Discharge: HOME | End: 2025-06-27
Attending: EMERGENCY MEDICINE
Payer: MEDICARE

## 2025-06-27 VITALS
WEIGHT: 254.63 LBS | HEART RATE: 78 BPM | TEMPERATURE: 97.9 F | RESPIRATION RATE: 18 BRPM | SYSTOLIC BLOOD PRESSURE: 132 MMHG | OXYGEN SATURATION: 98 % | BODY MASS INDEX: 40.92 KG/M2 | HEIGHT: 66 IN | DIASTOLIC BLOOD PRESSURE: 85 MMHG

## 2025-06-27 DIAGNOSIS — K59.00 CONSTIPATION: Primary | ICD-10-CM

## 2025-06-27 LAB
ALBUMIN SERPL BCG-MCNC: 4.2 G/DL (ref 3.5–5)
ALP SERPL-CCNC: 59 U/L (ref 34–104)
ALT SERPL W P-5'-P-CCNC: 13 U/L (ref 7–52)
ANION GAP SERPL CALCULATED.3IONS-SCNC: 5 MMOL/L (ref 4–13)
AST SERPL W P-5'-P-CCNC: 25 U/L (ref 13–39)
BACTERIA UR QL AUTO: NORMAL /HPF
BASOPHILS # BLD AUTO: 0.06 THOUSANDS/ÂΜL (ref 0–0.1)
BASOPHILS NFR BLD AUTO: 1 % (ref 0–1)
BILIRUB SERPL-MCNC: 0.75 MG/DL (ref 0.2–1)
BILIRUB UR QL STRIP: NEGATIVE
BUN SERPL-MCNC: 20 MG/DL (ref 5–25)
CALCIUM SERPL-MCNC: 9.6 MG/DL (ref 8.4–10.2)
CHLORIDE SERPL-SCNC: 104 MMOL/L (ref 96–108)
CLARITY UR: CLEAR
CO2 SERPL-SCNC: 27 MMOL/L (ref 21–32)
COLOR UR: YELLOW
CREAT SERPL-MCNC: 0.93 MG/DL (ref 0.6–1.3)
EOSINOPHIL # BLD AUTO: 0.28 THOUSAND/ÂΜL (ref 0–0.61)
EOSINOPHIL NFR BLD AUTO: 4 % (ref 0–6)
ERYTHROCYTE [DISTWIDTH] IN BLOOD BY AUTOMATED COUNT: 12.8 % (ref 11.6–15.1)
GFR SERPL CREATININE-BSD FRML MDRD: 73 ML/MIN/1.73SQ M
GLUCOSE SERPL-MCNC: 86 MG/DL (ref 65–140)
GLUCOSE UR STRIP-MCNC: NEGATIVE MG/DL
HCT VFR BLD AUTO: 42.2 % (ref 34.8–46.1)
HGB BLD-MCNC: 13 G/DL (ref 11.5–15.4)
HGB UR QL STRIP.AUTO: NEGATIVE
IMM GRANULOCYTES # BLD AUTO: 0.03 THOUSAND/UL (ref 0–0.2)
IMM GRANULOCYTES NFR BLD AUTO: 0 % (ref 0–2)
KETONES UR STRIP-MCNC: NEGATIVE MG/DL
LEUKOCYTE ESTERASE UR QL STRIP: ABNORMAL
LYMPHOCYTES # BLD AUTO: 2.1 THOUSANDS/ÂΜL (ref 0.6–4.47)
LYMPHOCYTES NFR BLD AUTO: 26 % (ref 14–44)
MCH RBC QN AUTO: 29.3 PG (ref 26.8–34.3)
MCHC RBC AUTO-ENTMCNC: 30.8 G/DL (ref 31.4–37.4)
MCV RBC AUTO: 95 FL (ref 82–98)
MONOCYTES # BLD AUTO: 0.66 THOUSAND/ÂΜL (ref 0.17–1.22)
MONOCYTES NFR BLD AUTO: 8 % (ref 4–12)
NEUTROPHILS # BLD AUTO: 4.9 THOUSANDS/ÂΜL (ref 1.85–7.62)
NEUTS SEG NFR BLD AUTO: 61 % (ref 43–75)
NITRITE UR QL STRIP: NEGATIVE
NON-SQ EPI CELLS URNS QL MICRO: NORMAL /HPF
NRBC BLD AUTO-RTO: 0 /100 WBCS
PH UR STRIP.AUTO: 6.5 [PH]
PLATELET # BLD AUTO: 218 THOUSANDS/UL (ref 149–390)
PMV BLD AUTO: 11.1 FL (ref 8.9–12.7)
POTASSIUM SERPL-SCNC: 4.6 MMOL/L (ref 3.5–5.3)
PROT SERPL-MCNC: 7.4 G/DL (ref 6.4–8.4)
PROT UR STRIP-MCNC: NEGATIVE MG/DL
RBC # BLD AUTO: 4.44 MILLION/UL (ref 3.81–5.12)
RBC #/AREA URNS AUTO: NORMAL /HPF
SODIUM SERPL-SCNC: 136 MMOL/L (ref 135–147)
SP GR UR STRIP.AUTO: 1.01 (ref 1–1.03)
UROBILINOGEN UR QL STRIP.AUTO: 0.2 E.U./DL
WBC # BLD AUTO: 8.03 THOUSAND/UL (ref 4.31–10.16)
WBC #/AREA URNS AUTO: NORMAL /HPF

## 2025-06-27 PROCEDURE — 74177 CT ABD & PELVIS W/CONTRAST: CPT

## 2025-06-27 PROCEDURE — 99284 EMERGENCY DEPT VISIT MOD MDM: CPT

## 2025-06-27 PROCEDURE — 36415 COLL VENOUS BLD VENIPUNCTURE: CPT | Performed by: EMERGENCY MEDICINE

## 2025-06-27 PROCEDURE — 85025 COMPLETE CBC W/AUTO DIFF WBC: CPT | Performed by: EMERGENCY MEDICINE

## 2025-06-27 PROCEDURE — 80053 COMPREHEN METABOLIC PANEL: CPT | Performed by: EMERGENCY MEDICINE

## 2025-06-27 PROCEDURE — 99285 EMERGENCY DEPT VISIT HI MDM: CPT | Performed by: EMERGENCY MEDICINE

## 2025-06-27 PROCEDURE — 81001 URINALYSIS AUTO W/SCOPE: CPT | Performed by: EMERGENCY MEDICINE

## 2025-06-27 RX ADMIN — IOHEXOL 100 ML: 350 INJECTION, SOLUTION INTRAVENOUS at 19:22

## 2025-06-27 NOTE — ED PROVIDER NOTES
Time reflects when diagnosis was documented in both MDM as applicable and the Disposition within this note       Time User Action Codes Description Comment    6/27/2025  7:29 PM Gilbert Mcknight Add [K59.00] Constipation           ED Disposition       ED Disposition   Discharge    Condition   Stable    Date/Time   Fri Jun 27, 2025  7:29 PM    Comment   Brennen Cerna discharge to home/self care.                   Assessment & Plan       Medical Decision Making  This patient presents with abdominal pain, constipation.   Diagnostic considerations include bowel obstruction, diverticulitis, appendicitis. See ED Course.       Amount and/or Complexity of Data Reviewed  Labs: ordered. Decision-making details documented in ED Course.  Radiology: ordered and independent interpretation performed. Decision-making details documented in ED Course.  ECG/medicine tests: ordered and independent interpretation performed. Decision-making details documented in ED Course.    Risk  Prescription drug management.        ED Course as of 06/27/25 2054 Fri Jun 27, 2025 1916 WBC: 8.03   1917 Leukocytes, UA(!): Small   1917 Nitrite, UA: Negative   2054 We discussed results, provided copy of CAT scan, questions answered, remained stable for close outpatient follow-up and will return if worse or additional symptoms       Medications   iohexol (OMNIPAQUE) 350 MG/ML injection (MULTI-DOSE) 100 mL (100 mL Intravenous Given 6/27/25 1922)       ED Risk Strat Scores                    No data recorded        SBIRT 20yo+      Flowsheet Row Most Recent Value   Initial Alcohol Screen: US AUDIT-C     1. How often do you have a drink containing alcohol? 0 Filed at: 06/27/2025 1843   2. How many drinks containing alcohol do you have on a typical day you are drinking?  0 Filed at: 06/27/2025 1843   3a. Male UNDER 65: How often do you have five or more drinks on one occasion? 0 Filed at: 06/27/2025 1843   3b. FEMALE Any Age, or MALE 65+: How often do you  have 4 or more drinks on one occassion? 0 Filed at: 06/27/2025 1843   Audit-C Score 0 Filed at: 06/27/2025 1843   ALEXIS: How many times in the past year have you...    Used an illegal drug or used a prescription medication for non-medical reasons? Never Filed at: 06/27/2025 1843                            History of Present Illness       Chief Complaint   Patient presents with    Constipation     Patient reports constipation x 4 weeks. Reports she has been having bowel movements, but they are small and extremely hard and she has to sit on the toilet for 20 minutes at a time. States she is getting very uncomfortable. Feels sick today. Took 2 laxatives last PM and had a very small, hard BM today. Does report going to nutritional response testing and being started on vitamins and supplements 5 weeks ago. Has not tried stool softener, but is drinking a lot of water.        Past Medical History[1]   Past Surgical History[2]   Family History[3]   Social History[4]   E-Cigarette/Vaping    E-Cigarette Use Former User       E-Cigarette/Vaping Substances    Nicotine No     THC No     CBD No     Flavoring No     Other No     Unknown No       I have reviewed and agree with the history as documented.     49-year-old female notes she started dietary supplements provided by mother a month ago with decreasing stooling frequency, harder, firmer stools and now left lower quadrant discomfort.  No fever or chills.  Tried a Dulcolax yesterday without resolution of symptoms.      History provided by:  Patient  Constipation  Timing:  Constant  Progression:  Worsening  Chronicity:  New  Context: not dehydration    Unusual stool frequency:  Intermittently over a few days, sometimes large, sometimes small  Relieved by:  Nothing  Worsened by:  Nothing  Associated symptoms: abdominal pain    Associated symptoms: no fever        Review of Systems   Constitutional:  Negative for fever.   Gastrointestinal:  Positive for abdominal pain and  constipation.   All other systems reviewed and are negative.          Objective       ED Triage Vitals [06/27/25 1842]   Temperature Pulse Blood Pressure Respirations SpO2 Patient Position - Orthostatic VS   97.9 °F (36.6 °C) 78 132/85 18 98 % Lying      Temp Source Heart Rate Source BP Location FiO2 (%) Pain Score    Temporal -- Right arm -- No Pain      Vitals      Date and Time Temp Pulse SpO2 Resp BP Pain Score FACES Pain Rating User   06/27/25 1842 97.9 °F (36.6 °C) 78 98 % 18 132/85 No Pain -- NB            Physical Exam  Vitals and nursing note reviewed.   Constitutional:       General: She is not in acute distress.     Appearance: Normal appearance. She is obese.      Comments: Pleasant, comfortable appearing, conversational, articulate   HENT:      Head: Normocephalic and atraumatic.      Right Ear: External ear normal.      Left Ear: External ear normal.      Nose: Nose normal.      Mouth/Throat:      Mouth: Mucous membranes are moist.     Eyes:      Extraocular Movements: Extraocular movements intact.      Pupils: Pupils are equal, round, and reactive to light.       Cardiovascular:      Rate and Rhythm: Normal rate and regular rhythm.      Heart sounds: No murmur heard.  Pulmonary:      Effort: Pulmonary effort is normal. No respiratory distress.      Breath sounds: Normal breath sounds. No wheezing or rales.   Abdominal:      General: Abdomen is flat. Bowel sounds are normal. There is no distension.      Tenderness: There is abdominal tenderness. There is no guarding or rebound.      Comments: Minimal left lower quadrant tenderness with deeper palpation     Musculoskeletal:      Cervical back: Neck supple.      Right lower leg: No edema.      Left lower leg: No edema.     Skin:     General: Skin is warm and dry.     Neurological:      General: No focal deficit present.      Mental Status: She is alert and oriented to person, place, and time.      Cranial Nerves: No cranial nerve deficit.      Sensory:  No sensory deficit.      Motor: No weakness.      Coordination: Coordination normal.     Psychiatric:         Mood and Affect: Mood normal.         Behavior: Behavior normal.         Results Reviewed       Procedure Component Value Units Date/Time    Comprehensive metabolic panel [[id number]] Collected: 06/27/25 1906    Lab Status: Final result Specimen: Blood from Arm, Right Updated: 06/27/25 1936     Sodium 136 mmol/L      Potassium 4.6 mmol/L      Chloride 104 mmol/L      CO2 27 mmol/L      ANION GAP 5 mmol/L      BUN 20 mg/dL      Creatinine 0.93 mg/dL      Glucose 86 mg/dL      Calcium 9.6 mg/dL      AST 25 U/L      ALT 13 U/L      Alkaline Phosphatase 59 U/L      Total Protein 7.4 g/dL      Albumin 4.2 g/dL      Total Bilirubin 0.75 mg/dL      eGFR 73 ml/min/1.73sq m     Narrative:      National Kidney Disease Foundation guidelines for Chronic Kidney Disease (CKD):     Stage 1 with normal or high GFR (GFR > 90 mL/min/1.73 square meters)    Stage 2 Mild CKD (GFR = 60-89 mL/min/1.73 square meters)    Stage 3A Moderate CKD (GFR = 45-59 mL/min/1.73 square meters)    Stage 3B Moderate CKD (GFR = 30-44 mL/min/1.73 square meters)    Stage 4 Severe CKD (GFR = 15-29 mL/min/1.73 square meters)    Stage 5 End Stage CKD (GFR <15 mL/min/1.73 square meters)  Note: GFR calculation is accurate only with a steady state creatinine    Urine Microscopic [[id number]]  (Normal) Collected: 06/27/25 1905    Lab Status: Final result Specimen: Urine, Clean Catch Updated: 06/27/25 1934     RBC, UA None Seen /hpf      WBC, UA 0-1 /hpf      Epithelial Cells Occasional /hpf      Bacteria, UA Occasional /hpf     UA w Reflex to Microscopic w Reflex to Culture [[id number]]  (Abnormal) Collected: 06/27/25 1905    Lab Status: Final result Specimen: Urine, Clean Catch Updated: 06/27/25 1915     Color, UA Yellow     Clarity, UA Clear     Specific Gravity, UA 1.010     pH, UA 6.5     Leukocytes, UA Small     Nitrite, UA Negative     Protein, UA  Negative mg/dl      Glucose, UA Negative mg/dl      Ketones, UA Negative mg/dl      Urobilinogen, UA 0.2 E.U./dl      Bilirubin, UA Negative     Occult Blood, UA Negative    CBC and differential [[id number]]  (Abnormal) Collected: 25    Lab Status: Final result Specimen: Blood from Arm, Right Updated: 25     WBC 8.03 Thousand/uL      RBC 4.44 Million/uL      Hemoglobin 13.0 g/dL      Hematocrit 42.2 %      MCV 95 fL      MCH 29.3 pg      MCHC 30.8 g/dL      RDW 12.8 %      MPV 11.1 fL      Platelets 218 Thousands/uL      nRBC 0 /100 WBCs      Segmented % 61 %      Immature Grans % 0 %      Lymphocytes % 26 %      Monocytes % 8 %      Eosinophils Relative 4 %      Basophils Relative 1 %      Absolute Neutrophils 4.90 Thousands/µL      Absolute Immature Grans 0.03 Thousand/uL      Absolute Lymphocytes 2.10 Thousands/µL      Absolute Monocytes 0.66 Thousand/µL      Eosinophils Absolute 0.28 Thousand/µL      Basophils Absolute 0.06 Thousands/µL             CT abdomen pelvis with contrast   Final Interpretation by Telly Escobedo DO (2015)      No acute findings in the abdomen or pelvis.      Other incidental findings as above.         Workstation performed: [id number]             Procedures    ED Medication and Procedure Management   Prior to Admission Medications   Prescriptions Last Dose Informant Patient Reported? Taking?   ARIPiprazole (ABILIFY DISCMELT) 15 mg dispersible tablet   Yes No   Sig: Take 15 mg by mouth in the morning.   atorvastatin (LIPITOR) 40 mg tablet   No No   Sig: Take 1 tablet (40 mg total) by mouth daily   buPROPion (WELLBUTRIN) 100 mg tablet   No No   Sig: TAKE 2 TABLETS IN MORNING AND 2 TABLETS IN EVENING   diclofenac potassium (CATAFLAM) 50 mg tablet   No No   Sig: Take 1 tablet (50 mg total) by mouth 2 (two) times a day Take medication with food   fluticasone (FLONASE) 50 mcg/act nasal spray   No No   Si spray into each nostril daily   lisinopril (ZESTRIL) 5  mg tablet   No No   Sig: Take 1 tablet (5 mg total) by mouth daily   lurasidone (LATUDA) 40 mg tablet   Yes No   Sig: Take 40 mg by mouth daily with breakfast   naltrexone (REVIA) 50 mg tablet   No No   Sig: TAKE 1 TABLET IN MORNING      Facility-Administered Medications: None     Patient's Medications   Discharge Prescriptions    No medications on file     No discharge procedures on file.  ED SEPSIS DOCUMENTATION   Time reflects when diagnosis was documented in both MDM as applicable and the Disposition within this note       Time User Action Codes Description Comment    2025  7:29 PM Gilbert Mcknight Add [K59.00] Constipation                    [1]   Past Medical History:  Diagnosis Date    Depression     Hyperlipemia     Hypertension     Myocardial infarction (HCC)     summer 2020    Pneumonia    [2]   Past Surgical History:  Procedure Laterality Date    LAPAROSCOPY FOR ECTOPIC PREGNANCY     [3]   Family History  Problem Relation Name Age of Onset    No Known Problems Mother      Hypertension Father      Diabetes Father      No Known Problems Daughter      No Known Problems Daughter      No Known Problems Maternal Grandmother      No Known Problems Maternal Grandfather      No Known Problems Paternal Grandmother      Lung cancer Paternal Grandfather      No Known Problems Maternal Aunt      No Known Problems Maternal Aunt     [4]   Social History  Tobacco Use    Smoking status: Former     Current packs/day: 0.00     Types: Cigarettes     Quit date:      Years since quittin.4     Passive exposure: Past    Smokeless tobacco: Never   Vaping Use    Vaping status: Former   Substance Use Topics    Alcohol use: Not Currently    Drug use: Not Currently        Gilbert Mcknight DO  25

## 2025-06-27 NOTE — DISCHARGE INSTRUCTIONS
Dulcolax daily as currently  Colace 100-200 mg daily  MiraLAX 17 g in 4 ounces liquid every 30-60 minutes until desired results, may repeat as needed

## 2025-07-01 ENCOUNTER — OFFICE VISIT (OUTPATIENT)
Dept: FAMILY MEDICINE CLINIC | Facility: CLINIC | Age: 50
End: 2025-07-01
Payer: MEDICARE

## 2025-07-01 VITALS
HEART RATE: 85 BPM | BODY MASS INDEX: 40.39 KG/M2 | SYSTOLIC BLOOD PRESSURE: 125 MMHG | WEIGHT: 250.22 LBS | TEMPERATURE: 98.2 F | DIASTOLIC BLOOD PRESSURE: 71 MMHG | OXYGEN SATURATION: 98 %

## 2025-07-01 DIAGNOSIS — R07.81 RIB PAIN ON RIGHT SIDE: ICD-10-CM

## 2025-07-01 DIAGNOSIS — K59.03 DRUG-INDUCED CONSTIPATION: Primary | ICD-10-CM

## 2025-07-01 DIAGNOSIS — M17.0 PRIMARY OSTEOARTHRITIS OF BOTH KNEES: ICD-10-CM

## 2025-07-01 DIAGNOSIS — K76.0 FATTY INFILTRATION OF LIVER: ICD-10-CM

## 2025-07-01 PROCEDURE — G2211 COMPLEX E/M VISIT ADD ON: HCPCS | Performed by: PHYSICIAN ASSISTANT

## 2025-07-01 PROCEDURE — 99214 OFFICE O/P EST MOD 30 MIN: CPT | Performed by: PHYSICIAN ASSISTANT

## 2025-07-01 NOTE — PROGRESS NOTES
Name: Brennen Cerna      : 1975      MRN: [id number]  Encounter Provider: Kelli Araya PA-C  Encounter Date: 2025   Encounter department: [facility] PRIMARY CARE  :  Assessment & Plan  Drug-induced constipation  Patient had gone to nutritional restoration classes and had been placed on multiple vitamins and supplements.  4 weeks after these were started, patient developed constipation that was so painful and bothersome that she went to the emergency department.  this did not respond to stimulant laxatives.  She was told to stop the supplements which she did and she is taking Colace 100 mg 3 times daily along with MiraLAX 1 cap daily.  She is moving her bowels without effort and the constipation is cured.       Rib pain on right side  Seen in Urgent Care in [place] -contusion to ribs that was normal.        Primary osteoarthritis of both knees  Sees Dr. Pink and has gotten gel injections which have not been effective.  Corticosteroids seem to help patient continues to have pain in both knees.       Fatty infiltration of liver  Recent CAT scan in the emergency department revealed fatty liver.  No signs of steatohepatitis on image.              History of Present Illness   HPI49-year-old female sees me for her primary care services.  Her mother brought her nutritional restoration classes which sold a lot of vitamins and supplements to the patient.  This resulted in constipation.  She is now off the supplements and bowel is back to normal.    Patient continues to have soreness under the ribs on the right side.  She was reaching for something and landed with her inferior rib cage on the right against wood and this was painful to her.  She continues to have this pain but it is easing.    Having a fun summer going to the [employer] and [employer] with passes to both.  Slow recovery.  Is now following appropriate regimen for her bowels and avoiding stimulant  laxatives.  Review of Systems: No recent URI or viral syndrome.    Objective   /71 (BP Location: Right arm, Patient Position: Sitting, Cuff Size: Standard)   Pulse 85   Temp 98.2 °F (36.8 °C) (Tympanic)   Wt 113 kg (250 lb 3.6 oz)   LMP 12/24/2020   SpO2 98%   BMI 40.39 kg/m²      Physical Exam: Reviewed vital signs.  She is normotensive and afebrile.    Heart is regular rate without murmur rub or gallop.  Lungs are clear to auscultation.  She continues to have some tenderness underneath the right rib cage.  Encouraged deep breathing to avoid atelectasis.  :    A total of 30 minutes spent reviewing the chart examining patient and obtaining history and providing medical advice.  I will see the patient on 9/30/2025 for her Medicare annual wellness.

## 2025-08-07 ENCOUNTER — HOSPITAL ENCOUNTER (EMERGENCY)
Facility: HOSPITAL | Age: 50
Discharge: HOME | End: 2025-08-07
Attending: EMERGENCY MEDICINE | Admitting: EMERGENCY MEDICINE
Payer: MEDICARE

## 2025-08-07 VITALS
BODY MASS INDEX: 40.35 KG/M2 | RESPIRATION RATE: 18 BRPM | SYSTOLIC BLOOD PRESSURE: 136 MMHG | DIASTOLIC BLOOD PRESSURE: 107 MMHG | TEMPERATURE: 98.6 F | OXYGEN SATURATION: 98 % | WEIGHT: 250 LBS | HEART RATE: 79 BPM

## 2025-08-07 DIAGNOSIS — F32.A DEPRESSION: Primary | ICD-10-CM

## 2025-08-07 DIAGNOSIS — N39.0 UTI (URINARY TRACT INFECTION): ICD-10-CM

## 2025-08-07 LAB
BACTERIA UR QL AUTO: ABNORMAL /HPF
BILIRUB UR QL STRIP: ABNORMAL
CLARITY UR: ABNORMAL
COLOR UR: YELLOW
ETHANOL EXG-MCNC: 0 MG/DL
EXT PREGNANCY TEST URINE: NEGATIVE
EXT. CONTROL: NORMAL
GLUCOSE UR STRIP-MCNC: NEGATIVE MG/DL
HGB UR QL STRIP.AUTO: ABNORMAL
HYALINE CASTS #/AREA URNS LPF: ABNORMAL /LPF
KETONES UR STRIP-MCNC: NEGATIVE MG/DL
LEUKOCYTE ESTERASE UR QL STRIP: ABNORMAL
MUCOUS THREADS UR QL AUTO: ABNORMAL
NITRITE UR QL STRIP: NEGATIVE
NON-SQ EPI CELLS URNS QL MICRO: ABNORMAL /HPF
PH UR STRIP.AUTO: 6 [PH]
PROT UR STRIP-MCNC: ABNORMAL MG/DL
RBC #/AREA URNS AUTO: ABNORMAL /HPF
SP GR UR STRIP.AUTO: >=1.03 (ref 1–1.03)
UROBILINOGEN UR QL STRIP.AUTO: 1 E.U./DL
WBC #/AREA URNS AUTO: ABNORMAL /HPF

## 2025-08-07 PROCEDURE — 81001 URINALYSIS AUTO W/SCOPE: CPT | Performed by: EMERGENCY MEDICINE

## 2025-08-07 PROCEDURE — 82075 ASSAY OF BREATH ETHANOL: CPT | Performed by: EMERGENCY MEDICINE

## 2025-08-07 PROCEDURE — 99284 EMERGENCY DEPT VISIT MOD MDM: CPT | Performed by: EMERGENCY MEDICINE

## 2025-08-07 PROCEDURE — 81025 URINE PREGNANCY TEST: CPT | Performed by: EMERGENCY MEDICINE

## 2025-08-07 PROCEDURE — 99283 EMERGENCY DEPT VISIT LOW MDM: CPT

## 2025-08-07 RX ORDER — CEPHALEXIN 500 MG/1
500 CAPSULE ORAL EVERY 6 HOURS SCHEDULED
Qty: 28 CAPSULE | Refills: 0 | Status: SHIPPED | OUTPATIENT
Start: 2025-08-07 | End: 2025-08-14

## 2025-08-07 RX ADMIN — CEPHALEXIN 500 MG: 250 CAPSULE ORAL at 21:03

## 2025-08-17 DIAGNOSIS — E66.813 CLASS 3 SEVERE OBESITY WITH SERIOUS COMORBIDITY AND BODY MASS INDEX (BMI) OF 40.0 TO 44.9 IN ADULT, UNSPECIFIED OBESITY TYPE: ICD-10-CM

## 2025-08-19 ENCOUNTER — OFFICE VISIT (OUTPATIENT)
Dept: FAMILY MEDICINE CLINIC | Facility: CLINIC | Age: 50
End: 2025-08-19
Payer: MEDICARE

## 2025-08-19 VITALS
HEART RATE: 82 BPM | SYSTOLIC BLOOD PRESSURE: 102 MMHG | DIASTOLIC BLOOD PRESSURE: 80 MMHG | OXYGEN SATURATION: 98 % | TEMPERATURE: 98.1 F | WEIGHT: 251.77 LBS | HEIGHT: 66 IN | BODY MASS INDEX: 40.46 KG/M2

## 2025-08-19 DIAGNOSIS — E66.813 CLASS 3 SEVERE OBESITY WITH SERIOUS COMORBIDITY AND BODY MASS INDEX (BMI) OF 40.0 TO 44.9 IN ADULT, UNSPECIFIED OBESITY TYPE: ICD-10-CM

## 2025-08-19 DIAGNOSIS — F25.1 SCHIZOAFFECTIVE DISORDER, DEPRESSIVE TYPE (HCC): Primary | ICD-10-CM

## 2025-08-19 PROCEDURE — G2211 COMPLEX E/M VISIT ADD ON: HCPCS | Performed by: PHYSICIAN ASSISTANT

## 2025-08-19 PROCEDURE — 99214 OFFICE O/P EST MOD 30 MIN: CPT | Performed by: PHYSICIAN ASSISTANT

## 2025-08-19 RX ORDER — NALTREXONE HYDROCHLORIDE 50 MG/1
TABLET, FILM COATED ORAL
Qty: 90 TABLET | Refills: 1 | Status: SHIPPED | OUTPATIENT
Start: 2025-08-19

## 2025-08-22 RX ORDER — NALTREXONE HYDROCHLORIDE 50 MG/1
TABLET, FILM COATED ORAL
Qty: 90 TABLET | Refills: 0 | Status: SHIPPED | OUTPATIENT
Start: 2025-08-22